# Patient Record
Sex: FEMALE | Race: WHITE | Employment: FULL TIME | URBAN - METROPOLITAN AREA
[De-identification: names, ages, dates, MRNs, and addresses within clinical notes are randomized per-mention and may not be internally consistent; named-entity substitution may affect disease eponyms.]

---

## 2017-01-07 ENCOUNTER — HOSPITAL ENCOUNTER (OUTPATIENT)
Dept: ULTRASOUND IMAGING | Age: 52
Discharge: HOME | End: 2017-01-07
Attending: INTERNAL MEDICINE
Payer: COMMERCIAL

## 2017-01-07 DIAGNOSIS — C50.511 MALIGNANT NEOPLASM OF LOWER-OUTER QUADRANT OF RIGHT FEMALE BREAST (HCC): ICD-10-CM

## 2017-01-07 PROCEDURE — 76700 US EXAM ABDOM COMPLETE: CPT

## 2017-01-16 ENCOUNTER — HOSPITAL ENCOUNTER (OUTPATIENT)
Dept: PREADMISSION TESTING | Age: 52
Discharge: HOME | End: 2017-01-16
Attending: PLASTIC SURGERY
Payer: COMMERCIAL

## 2017-01-16 VITALS
TEMPERATURE: 97.9 F | RESPIRATION RATE: 16 BRPM | OXYGEN SATURATION: 97 % | HEART RATE: 86 BPM | SYSTOLIC BLOOD PRESSURE: 139 MMHG | HEIGHT: 62 IN | WEIGHT: 175.04 LBS | DIASTOLIC BLOOD PRESSURE: 91 MMHG | BODY MASS INDEX: 32.21 KG/M2

## 2017-01-16 LAB
ANION GAP BLD CALC-SCNC: 12 MMOL/L (ref 5–15)
BUN SERPL-MCNC: 10 MG/DL (ref 6–20)
BUN/CREAT SERPL: 17 (ref 12–20)
CALCIUM SERPL-MCNC: 8.6 MG/DL (ref 8.5–10.1)
CHLORIDE SERPL-SCNC: 108 MMOL/L (ref 97–108)
CO2 SERPL-SCNC: 24 MMOL/L (ref 21–32)
CREAT SERPL-MCNC: 0.58 MG/DL (ref 0.55–1.02)
ERYTHROCYTE [DISTWIDTH] IN BLOOD BY AUTOMATED COUNT: 12.9 % (ref 11.5–14.5)
GLUCOSE SERPL-MCNC: 83 MG/DL (ref 65–100)
HCT VFR BLD AUTO: 41.5 % (ref 35–47)
HGB BLD-MCNC: 14.1 G/DL (ref 11.5–16)
MCH RBC QN AUTO: 29.9 PG (ref 26–34)
MCHC RBC AUTO-ENTMCNC: 34 G/DL (ref 30–36.5)
MCV RBC AUTO: 88.1 FL (ref 80–99)
PLATELET # BLD AUTO: 261 K/UL (ref 150–400)
POTASSIUM SERPL-SCNC: 3.7 MMOL/L (ref 3.5–5.1)
RBC # BLD AUTO: 4.71 M/UL (ref 3.8–5.2)
SODIUM SERPL-SCNC: 144 MMOL/L (ref 136–145)
WBC # BLD AUTO: 7.2 K/UL (ref 3.6–11)

## 2017-01-16 PROCEDURE — 93005 ELECTROCARDIOGRAM TRACING: CPT

## 2017-01-16 PROCEDURE — 85027 COMPLETE CBC AUTOMATED: CPT | Performed by: PLASTIC SURGERY

## 2017-01-16 PROCEDURE — 80048 BASIC METABOLIC PNL TOTAL CA: CPT | Performed by: PLASTIC SURGERY

## 2017-01-16 PROCEDURE — 36415 COLL VENOUS BLD VENIPUNCTURE: CPT | Performed by: PLASTIC SURGERY

## 2017-01-16 RX ORDER — TAMOXIFEN CITRATE 20 MG/1
20 TABLET ORAL DAILY
COMMUNITY
End: 2021-09-13

## 2017-01-16 NOTE — PERIOP NOTES
[facility]  Preoperative Instructions        Surgery Date 1/27/2017         Time of Arrival 11:00 AM    1. On the day of your surgery, please report to the Surgical Services Registration Desk and sign in at your designated time. The Surgery Center is located to the right of the Emergency Room. 2. You must have someone with you to drive you home. You should not drive a car for 24 hours following surgery. Please make arrangements for a friend or family member to stay with you for the first 24 hours after your surgery. 3. Do not have anything to eat or drink (including water, gum, mints, coffee, juice) after midnight 1/26/2017             . This may not apply to medications prescribed by your physician. Please note special instructions, if applicable. If you are currently taking Plavix, Coumadin, or other blood-thinning agents, contact your surgeon for instructions. 4. We recommend you do not drink any alcoholic beverages for 24 hours before and after your surgery. 5. Have a list of all current medications, including vitamins, herbal supplements and any other over the counter medications. Stop all Aspirin and non-steroidal anti-inflammatory drugs (I.e. Advil, Aleve), as directed by your surgeon's office. Stop all vitamins and herbal supplements seven days prior to your surgery. 6. Wear comfortable clothes. Wear glasses instead of contacts. Do not bring any money or jewelry. Please bring picture ID, insurance card, and any prearranged co-payment or hospital payment. Do not wear make-up, particularly mascara the morning of your surgery. Do not wear nail polish, particularly if you are having foot /hand surgery. Wear your hair loose or down, no ponytails, buns, tash pins or clips. All body piercings must be removed.   Please shower with antibacterial soap for three consecutive days before and on the morning of surgery, but do not apply any lotions, powders or deodorants after the shower on the day of surgery. Please use a fresh towels after each shower. Please sleep in clean clothes and change bed linens the night before surgery. Please do not shave for 48 hours prior to surgery. Shaving of the face is acceptable. 7. You should understand that if you do not follow these instructions your surgery may be cancelled. If your physical condition changes (I.e. fever, cold or flu) please contact your surgeon as soon as possible. 8. It is important that you be on time. If a situation occurs where you may be late, please call [phone] (OR Holding Area). 9. If you have any questions and or problems, please call [phone] (Pre-admission Testing). 10. Your surgery time may be subject to change. You will receive a phone call the evening prior if your time changes. 11.  If having outpatient surgery, you must have someone to drive you here, stay with you during the duration of your stay, and to drive you home at time of discharge. Special Instructions:Stop Voltaren Gel 7 days prior to surgery    MEDICATIONS TO TAKE THE MORNING OF SURGERY WITH A SIP OF WATER:Prilosec, Tamoxifen, Xanax if needed      I understand a pre-operative phone call will be made to verify my surgery time. In the event that I am not available, I give permission for a message to be left on my answering service and/or with another person?   Yes [phone]         ___________________      __________   _________    (Signature of Patient)             (Witness)                (Date and Time)

## 2017-01-17 LAB
ATRIAL RATE: 82 BPM
CALCULATED P AXIS, ECG09: 40 DEGREES
CALCULATED R AXIS, ECG10: -5 DEGREES
CALCULATED T AXIS, ECG11: 16 DEGREES
DIAGNOSIS, 93000: NORMAL
P-R INTERVAL, ECG05: 136 MS
Q-T INTERVAL, ECG07: 400 MS
QRS DURATION, ECG06: 70 MS
QTC CALCULATION (BEZET), ECG08: 467 MS
VENTRICULAR RATE, ECG03: 82 BPM

## 2017-02-10 ENCOUNTER — HOSPITAL ENCOUNTER (OUTPATIENT)
Age: 52
Setting detail: OUTPATIENT SURGERY
Discharge: HOME | End: 2017-02-10
Attending: PLASTIC SURGERY | Admitting: PLASTIC SURGERY
Payer: COMMERCIAL

## 2017-02-10 ENCOUNTER — ANESTHESIA EVENT (OUTPATIENT)
Dept: SURGERY | Age: 52
End: 2017-02-10
Payer: COMMERCIAL

## 2017-02-10 ENCOUNTER — ANESTHESIA (OUTPATIENT)
Dept: SURGERY | Age: 52
End: 2017-02-10
Payer: COMMERCIAL

## 2017-02-10 ENCOUNTER — SURGERY (OUTPATIENT)
Age: 52
End: 2017-02-10

## 2017-02-10 VITALS
OXYGEN SATURATION: 95 % | SYSTOLIC BLOOD PRESSURE: 133 MMHG | TEMPERATURE: 98 F | HEART RATE: 90 BPM | HEIGHT: 62 IN | BODY MASS INDEX: 31.81 KG/M2 | WEIGHT: 172.84 LBS | DIASTOLIC BLOOD PRESSURE: 80 MMHG | RESPIRATION RATE: 14 BRPM

## 2017-02-10 PROCEDURE — 77030008684 HC TU ET CUF COVD -B: Performed by: NURSE ANESTHETIST, CERTIFIED REGISTERED

## 2017-02-10 PROCEDURE — 74011000272 HC RX REV CODE- 272: Performed by: PLASTIC SURGERY

## 2017-02-10 PROCEDURE — 77030026227 HC FUNL KELLR 2 PCH ALGN -C: Performed by: PLASTIC SURGERY

## 2017-02-10 PROCEDURE — 77030008459 HC STPLR SKN COOP -B: Performed by: PLASTIC SURGERY

## 2017-02-10 PROCEDURE — C1789 PROSTHESIS, BREAST, IMP: HCPCS | Performed by: PLASTIC SURGERY

## 2017-02-10 PROCEDURE — 77030026438 HC STYL ET INTUB CARD -A: Performed by: NURSE ANESTHETIST, CERTIFIED REGISTERED

## 2017-02-10 PROCEDURE — 77030013079 HC BLNKT BAIR HGGR 3M -A: Performed by: ANESTHESIOLOGY

## 2017-02-10 PROCEDURE — 76210000016 HC OR PH I REC 1 TO 1.5 HR: Performed by: PLASTIC SURGERY

## 2017-02-10 PROCEDURE — 77030020269 HC MISC IMPL: Performed by: PLASTIC SURGERY

## 2017-02-10 PROCEDURE — 77030010507 HC ADH SKN DERMBND J&J -B: Performed by: PLASTIC SURGERY

## 2017-02-10 PROCEDURE — 76060000035 HC ANESTHESIA 2 TO 2.5 HR: Performed by: PLASTIC SURGERY

## 2017-02-10 PROCEDURE — 77030018836 HC SOL IRR NACL ICUM -A: Performed by: PLASTIC SURGERY

## 2017-02-10 PROCEDURE — 77030018846 HC SOL IRR STRL H20 ICUM -A: Performed by: PLASTIC SURGERY

## 2017-02-10 PROCEDURE — 77030020782 HC GWN BAIR PAWS FLX 3M -B

## 2017-02-10 PROCEDURE — 74011250636 HC RX REV CODE- 250/636

## 2017-02-10 PROCEDURE — 76010000131 HC OR TIME 2 TO 2.5 HR: Performed by: PLASTIC SURGERY

## 2017-02-10 PROCEDURE — 77030011264 HC ELECTRD BLD EXT COVD -A: Performed by: PLASTIC SURGERY

## 2017-02-10 PROCEDURE — 77030008771 HC TU NG SALEM SUMP -A: Performed by: NURSE ANESTHETIST, CERTIFIED REGISTERED

## 2017-02-10 PROCEDURE — 77030008467 HC STPLR SKN COVD -B: Performed by: PLASTIC SURGERY

## 2017-02-10 PROCEDURE — 77030013674 HC FIL EXPND TISS ALGN -A: Performed by: PLASTIC SURGERY

## 2017-02-10 PROCEDURE — 76210000020 HC REC RM PH II FIRST 0.5 HR: Performed by: PLASTIC SURGERY

## 2017-02-10 PROCEDURE — 77030020263 HC SOL INJ SOD CL0.9% LFCR 1000ML: Performed by: PLASTIC SURGERY

## 2017-02-10 PROCEDURE — 74011000250 HC RX REV CODE- 250: Performed by: PLASTIC SURGERY

## 2017-02-10 PROCEDURE — 77030011640 HC PAD GRND REM COVD -A: Performed by: PLASTIC SURGERY

## 2017-02-10 PROCEDURE — 77030031139 HC SUT VCRL2 J&J -A: Performed by: PLASTIC SURGERY

## 2017-02-10 PROCEDURE — 77030002933 HC SUT MCRYL J&J -A: Performed by: PLASTIC SURGERY

## 2017-02-10 PROCEDURE — 74011250637 HC RX REV CODE- 250/637: Performed by: ANESTHESIOLOGY

## 2017-02-10 PROCEDURE — 77030032490 HC SLV COMPR SCD KNE COVD -B: Performed by: PLASTIC SURGERY

## 2017-02-10 PROCEDURE — 74011250636 HC RX REV CODE- 250/636: Performed by: PLASTIC SURGERY

## 2017-02-10 PROCEDURE — 77030019908 HC STETH ESOPH SIMS -A: Performed by: NURSE ANESTHETIST, CERTIFIED REGISTERED

## 2017-02-10 PROCEDURE — 74011000250 HC RX REV CODE- 250

## 2017-02-10 PROCEDURE — 74011250636 HC RX REV CODE- 250/636: Performed by: ANESTHESIOLOGY

## 2017-02-10 RX ORDER — PROPOFOL 10 MG/ML
INJECTION, EMULSION INTRAVENOUS AS NEEDED
Status: DISCONTINUED | OUTPATIENT
Start: 2017-02-10 | End: 2017-02-10 | Stop reason: HOSPADM

## 2017-02-10 RX ORDER — SODIUM CHLORIDE, SODIUM LACTATE, POTASSIUM CHLORIDE, CALCIUM CHLORIDE 600; 310; 30; 20 MG/100ML; MG/100ML; MG/100ML; MG/100ML
25 INJECTION, SOLUTION INTRAVENOUS CONTINUOUS
Status: DISCONTINUED | OUTPATIENT
Start: 2017-02-10 | End: 2017-02-10 | Stop reason: HOSPADM

## 2017-02-10 RX ORDER — CEPHALEXIN 500 MG/1
500 CAPSULE ORAL 4 TIMES DAILY
Qty: 20 CAP | Refills: 0 | Status: SHIPPED | OUTPATIENT
Start: 2017-02-10 | End: 2017-02-15

## 2017-02-10 RX ORDER — HYDROMORPHONE HYDROCHLORIDE 2 MG/ML
INJECTION, SOLUTION INTRAMUSCULAR; INTRAVENOUS; SUBCUTANEOUS AS NEEDED
Status: DISCONTINUED | OUTPATIENT
Start: 2017-02-10 | End: 2017-02-10 | Stop reason: HOSPADM

## 2017-02-10 RX ORDER — DIPHENHYDRAMINE HYDROCHLORIDE 50 MG/ML
12.5 INJECTION, SOLUTION INTRAMUSCULAR; INTRAVENOUS AS NEEDED
Status: DISCONTINUED | OUTPATIENT
Start: 2017-02-10 | End: 2017-02-10 | Stop reason: HOSPADM

## 2017-02-10 RX ORDER — LIDOCAINE HYDROCHLORIDE 10 MG/ML
0.1 INJECTION, SOLUTION EPIDURAL; INFILTRATION; INTRACAUDAL; PERINEURAL AS NEEDED
Status: DISCONTINUED | OUTPATIENT
Start: 2017-02-10 | End: 2017-02-10 | Stop reason: HOSPADM

## 2017-02-10 RX ORDER — CEFAZOLIN SODIUM IN 0.9 % NACL 2 G/100 ML
2 PLASTIC BAG, INJECTION (ML) INTRAVENOUS EVERY 8 HOURS
Status: COMPLETED | OUTPATIENT
Start: 2017-02-10 | End: 2017-02-10

## 2017-02-10 RX ORDER — DEXAMETHASONE SODIUM PHOSPHATE 4 MG/ML
INJECTION, SOLUTION INTRA-ARTICULAR; INTRALESIONAL; INTRAMUSCULAR; INTRAVENOUS; SOFT TISSUE AS NEEDED
Status: DISCONTINUED | OUTPATIENT
Start: 2017-02-10 | End: 2017-02-10 | Stop reason: HOSPADM

## 2017-02-10 RX ORDER — ESMOLOL HYDROCHLORIDE 10 MG/ML
INJECTION INTRAVENOUS AS NEEDED
Status: DISCONTINUED | OUTPATIENT
Start: 2017-02-10 | End: 2017-02-10 | Stop reason: HOSPADM

## 2017-02-10 RX ORDER — ROCURONIUM BROMIDE 10 MG/ML
INJECTION, SOLUTION INTRAVENOUS AS NEEDED
Status: DISCONTINUED | OUTPATIENT
Start: 2017-02-10 | End: 2017-02-10 | Stop reason: HOSPADM

## 2017-02-10 RX ORDER — HYDROMORPHONE HYDROCHLORIDE 2 MG/1
2 TABLET ORAL
Qty: 40 TAB | Refills: 0 | Status: SHIPPED | OUTPATIENT
Start: 2017-02-10 | End: 2017-04-28

## 2017-02-10 RX ORDER — PROMETHAZINE HYDROCHLORIDE 12.5 MG/1
12.5 TABLET ORAL
Qty: 10 TAB | Refills: 2 | Status: SHIPPED | OUTPATIENT
Start: 2017-02-10 | End: 2018-01-09

## 2017-02-10 RX ORDER — HYDROMORPHONE HYDROCHLORIDE 1 MG/ML
.2-.5 INJECTION, SOLUTION INTRAMUSCULAR; INTRAVENOUS; SUBCUTANEOUS
Status: DISCONTINUED | OUTPATIENT
Start: 2017-02-10 | End: 2017-02-10 | Stop reason: HOSPADM

## 2017-02-10 RX ORDER — MORPHINE SULFATE 10 MG/ML
2 INJECTION, SOLUTION INTRAMUSCULAR; INTRAVENOUS
Status: DISCONTINUED | OUTPATIENT
Start: 2017-02-10 | End: 2017-02-10 | Stop reason: HOSPADM

## 2017-02-10 RX ORDER — FENTANYL CITRATE 50 UG/ML
50 INJECTION, SOLUTION INTRAMUSCULAR; INTRAVENOUS AS NEEDED
Status: DISCONTINUED | OUTPATIENT
Start: 2017-02-10 | End: 2017-02-10 | Stop reason: HOSPADM

## 2017-02-10 RX ORDER — TRAMADOL HYDROCHLORIDE 50 MG/1
50 TABLET ORAL
COMMUNITY
End: 2019-01-18

## 2017-02-10 RX ORDER — FENTANYL CITRATE 50 UG/ML
INJECTION, SOLUTION INTRAMUSCULAR; INTRAVENOUS AS NEEDED
Status: DISCONTINUED | OUTPATIENT
Start: 2017-02-10 | End: 2017-02-10 | Stop reason: HOSPADM

## 2017-02-10 RX ORDER — ACETAMINOPHEN 10 MG/ML
INJECTION, SOLUTION INTRAVENOUS AS NEEDED
Status: DISCONTINUED | OUTPATIENT
Start: 2017-02-10 | End: 2017-02-10 | Stop reason: HOSPADM

## 2017-02-10 RX ORDER — MIDAZOLAM HYDROCHLORIDE 1 MG/ML
0.5 INJECTION, SOLUTION INTRAMUSCULAR; INTRAVENOUS
Status: DISCONTINUED | OUTPATIENT
Start: 2017-02-10 | End: 2017-02-10 | Stop reason: HOSPADM

## 2017-02-10 RX ORDER — ONDANSETRON 2 MG/ML
INJECTION INTRAMUSCULAR; INTRAVENOUS AS NEEDED
Status: DISCONTINUED | OUTPATIENT
Start: 2017-02-10 | End: 2017-02-10 | Stop reason: HOSPADM

## 2017-02-10 RX ORDER — FENTANYL CITRATE 50 UG/ML
25 INJECTION, SOLUTION INTRAMUSCULAR; INTRAVENOUS
Status: DISCONTINUED | OUTPATIENT
Start: 2017-02-10 | End: 2017-02-10 | Stop reason: HOSPADM

## 2017-02-10 RX ORDER — LIDOCAINE HYDROCHLORIDE 20 MG/ML
INJECTION, SOLUTION EPIDURAL; INFILTRATION; INTRACAUDAL; PERINEURAL AS NEEDED
Status: DISCONTINUED | OUTPATIENT
Start: 2017-02-10 | End: 2017-02-10 | Stop reason: HOSPADM

## 2017-02-10 RX ORDER — ONDANSETRON 2 MG/ML
4 INJECTION INTRAMUSCULAR; INTRAVENOUS AS NEEDED
Status: DISCONTINUED | OUTPATIENT
Start: 2017-02-10 | End: 2017-02-10 | Stop reason: HOSPADM

## 2017-02-10 RX ORDER — SUCCINYLCHOLINE CHLORIDE 20 MG/ML
INJECTION INTRAMUSCULAR; INTRAVENOUS AS NEEDED
Status: DISCONTINUED | OUTPATIENT
Start: 2017-02-10 | End: 2017-02-10 | Stop reason: HOSPADM

## 2017-02-10 RX ORDER — SCOLOPAMINE TRANSDERMAL SYSTEM 1 MG/1
1.5 PATCH, EXTENDED RELEASE TRANSDERMAL
Status: DISCONTINUED | OUTPATIENT
Start: 2017-02-10 | End: 2017-02-10 | Stop reason: HOSPADM

## 2017-02-10 RX ORDER — SODIUM CHLORIDE 0.9 % (FLUSH) 0.9 %
5-10 SYRINGE (ML) INJECTION AS NEEDED
Status: DISCONTINUED | OUTPATIENT
Start: 2017-02-10 | End: 2017-02-10 | Stop reason: HOSPADM

## 2017-02-10 RX ORDER — MIDAZOLAM HYDROCHLORIDE 1 MG/ML
INJECTION, SOLUTION INTRAMUSCULAR; INTRAVENOUS AS NEEDED
Status: DISCONTINUED | OUTPATIENT
Start: 2017-02-10 | End: 2017-02-10 | Stop reason: HOSPADM

## 2017-02-10 RX ADMIN — FENTANYL CITRATE 100 MCG: 50 INJECTION, SOLUTION INTRAMUSCULAR; INTRAVENOUS at 07:51

## 2017-02-10 RX ADMIN — ONDANSETRON 4 MG: 2 INJECTION INTRAMUSCULAR; INTRAVENOUS at 09:34

## 2017-02-10 RX ADMIN — LIDOCAINE HYDROCHLORIDE 50 ML: 10; .005 INJECTION, SOLUTION EPIDURAL; INFILTRATION; INTRACAUDAL; PERINEURAL at 09:29

## 2017-02-10 RX ADMIN — FENTANYL CITRATE 25 MCG: 50 INJECTION, SOLUTION INTRAMUSCULAR; INTRAVENOUS at 11:00

## 2017-02-10 RX ADMIN — ESMOLOL HYDROCHLORIDE 10 MG: 10 INJECTION INTRAVENOUS at 10:02

## 2017-02-10 RX ADMIN — SODIUM CHLORIDE, SODIUM LACTATE, POTASSIUM CHLORIDE, AND CALCIUM CHLORIDE 25 ML/HR: 600; 310; 30; 20 INJECTION, SOLUTION INTRAVENOUS at 07:09

## 2017-02-10 RX ADMIN — HYDROMORPHONE HYDROCHLORIDE 0.25 MG: 2 INJECTION, SOLUTION INTRAMUSCULAR; INTRAVENOUS; SUBCUTANEOUS at 08:23

## 2017-02-10 RX ADMIN — PROPOFOL 50 MG: 10 INJECTION, EMULSION INTRAVENOUS at 08:14

## 2017-02-10 RX ADMIN — FENTANYL CITRATE 25 MCG: 50 INJECTION, SOLUTION INTRAMUSCULAR; INTRAVENOUS at 10:35

## 2017-02-10 RX ADMIN — MIDAZOLAM HYDROCHLORIDE 2 MG: 1 INJECTION, SOLUTION INTRAMUSCULAR; INTRAVENOUS at 07:43

## 2017-02-10 RX ADMIN — LIDOCAINE HYDROCHLORIDE 80 MG: 20 INJECTION, SOLUTION EPIDURAL; INFILTRATION; INTRACAUDAL; PERINEURAL at 07:51

## 2017-02-10 RX ADMIN — PROPOFOL 200 MG: 10 INJECTION, EMULSION INTRAVENOUS at 07:51

## 2017-02-10 RX ADMIN — SODIUM CHLORIDE, SODIUM LACTATE, POTASSIUM CHLORIDE, AND CALCIUM CHLORIDE: 600; 310; 30; 20 INJECTION, SOLUTION INTRAVENOUS at 09:19

## 2017-02-10 RX ADMIN — CEFAZOLIN 2 G: 10 INJECTION, POWDER, FOR SOLUTION INTRAVENOUS; PARENTERAL at 07:44

## 2017-02-10 RX ADMIN — HYDROMORPHONE HYDROCHLORIDE 0.25 MG: 2 INJECTION, SOLUTION INTRAMUSCULAR; INTRAVENOUS; SUBCUTANEOUS at 09:34

## 2017-02-10 RX ADMIN — ACETAMINOPHEN 1000 MG: 10 INJECTION, SOLUTION INTRAVENOUS at 08:34

## 2017-02-10 RX ADMIN — HYDROMORPHONE HYDROCHLORIDE 0.25 MG: 2 INJECTION, SOLUTION INTRAMUSCULAR; INTRAVENOUS; SUBCUTANEOUS at 08:15

## 2017-02-10 RX ADMIN — ROCURONIUM BROMIDE 10 MG: 10 INJECTION, SOLUTION INTRAVENOUS at 07:51

## 2017-02-10 RX ADMIN — SUCCINYLCHOLINE CHLORIDE 160 MG: 20 INJECTION INTRAMUSCULAR; INTRAVENOUS at 07:51

## 2017-02-10 RX ADMIN — DEXAMETHASONE SODIUM PHOSPHATE 8 MG: 4 INJECTION, SOLUTION INTRA-ARTICULAR; INTRALESIONAL; INTRAMUSCULAR; INTRAVENOUS; SOFT TISSUE at 08:26

## 2017-02-10 RX ADMIN — BACITRACIN 1000 ML: 50000 INJECTION, POWDER, FOR SOLUTION INTRAMUSCULAR at 09:38

## 2017-02-10 NOTE — OP NOTES
[address], [facility]   OP NOTE       Name:  Anahi Crow   MR#:  [id number]   :  1965   Account #:  [de-identified]    Surgery Date:  02/10/2017   Date of Adm:  02/10/2017       PREOPERATIVE DIAGNOSES   1. Personal history of right breast cancer. 2. Acquired absence of right breast and nipple. 3. Left breast ptosis. 4. Breast asymmetry and disproportion after reconstruction. POSTOPERATIVE DIAGNOSES   1. Personal history of right breast cancer. 2. Acquired absence of right breast and nipple. 3. Left breast ptosis. 4. Breast asymmetry and disproportion after reconstruction. PROCEDURES PERFORMED   1. Delayed right breast reconstruction with breast implants (Riparius 190   mL smooth, round, Moderate Profile gel). 2. Left augmentation mastopexy (Riparius 170 mL smooth, round,   Moderate Profile gel). SURGEON: Sarah Beth Leyva MD    ANESTHESIA: General endotracheal.    ESTIMATED BLOOD LOSS: Approximately 20 mL. SPECIMENS REMOVED: None. COMPLICATIONS: None. INDICATIONS: This 75-year-old white female presented after   undergoing right subtotal mastectomy, including removal of her nipple   for breast cancer. She had completed adjuvant radiation therapy as   well. She presented with a significant right breast defect and required   reconstruction with an implant. This would leave her with significant   breast asymmetry, as the left breast was ptotic and would require an   implant as well to match. Risks, benefits, and alternatives were   discussed preoperatively, and she agreed to proceed. DESCRIPTION OF PROCEDURE: After informed consent was   obtained, she was marked in the preoperative holding area in the   standing position. She was then taken to the operating room, where a   general anesthetic was given.  The chest was infiltrated with a mixture   of 1% lidocaine with epinephrine and 0.25% bupivacaine with   epinephrine and then prepped and draped. Attention was turned to the right breast, where a 3 cm inframammary   incision was made. The lateral edge of the pectoralis muscle was   identified, and a subpectoral pocket was sharply developed. There was   extensive fibrosis from her previous operation and from radiation. This   made dissection difficult. The pectoralis muscle was released from the   chest wall inferiorly. Dissection continued to the inframammary fold,   thereby creating a dual plane-type pocket. Radial scoring was   performed along the breast in the lower pole, allowing this area to   expand to accommodate the implant. When the pocket was   satisfactory, hemostasis was obtained. A saline sizer was inserted and   filled with 190 mL sterile IV saline. Attention was then turned to the left side, where a similar procedure   was carried out, again utilizing an inframammary incision to create a   dual plane-type pocket. Again, a sizer was inserted and filled with 170   mL. The patient was placed in the seated position. There was   significant asymmetry, with the left breast more ptotic. A vertical   mammoplasty pattern was designed and tailor tacked with staples. Symmetry was much improved. She was returned to the supine   position. The saline sizers were removed, and the pockets were   irrigated with saline and triple-antibiotic solution. Gel implants were   then placed using the Zimmerman funnel and no-touch technique. The   incisions were closed in layers with absorbable suture. Attention was then turned to the left breast, where the staples were   removed. The vertical mammoplasty pattern was de-epithelialized,   excluding the nipple-areolar complex. Skin flaps were raised   circumferentially. Hemostasis was obtained. The vertical pillars were   approximated with 3-0 Vicryl. The nipple was brought into its new   position and secured.  Remaining incisions were closed in layers with   absorbable staples and sutures. Dermabond and dry dressings were   applied. She tolerated the procedure well and was transferred to the recovery   room in good condition after extubation.         Kvng Hester MD        / Sivan Nunez   D:  02/10/2017   11:20   T:  02/10/2017   11:48   Job #:  [id number]

## 2017-02-10 NOTE — BRIEF OP NOTE
BRIEF OPERATIVE NOTE    Date of Procedure: 2/10/2017   Preoperative Diagnosis: BREAST CANCER  Postoperative Diagnosis: BREAST CANCER    Procedure(s):  RIGHT BREAST RECONSTRUCTION WITH BREAST IMPLANT ([place] mod profile 190ml), LEFT BREAST AUGMENTATION AND MASTOPEXY ([place] 170ml mod profile)  Surgeon(s) and Role:     * Seb Steele MD - Primary            Surgical Staff:  Circ-1: Shane Gage RN  Scrub Tech-1: Donell Marx  Scrub Tech-2: Vira Castle  Surg Asst-1: Kevin viviana  Float Staff: Paramjit Ruiz RN  Event Time In   Incision Start 1805   Incision Close      Anesthesia: General   Estimated Blood Loss: 20ml  Specimens: * No specimens in log *   Findings: see note   Complications: none  Implants:   Implant Name Type Inv.  Item Serial No.  Lot No. LRB No. Used Action   MENTOR MEMORY GEL BREAST IMPLANT   [id number]  [id number] Right 1 Implanted   MENTOR MEMORY GEL BREAST IMPLANT     [id number]   [id number] Left 1 Implanted

## 2017-02-10 NOTE — DISCHARGE INSTRUCTIONS
May remove dressings in 24hrs and shower and get incisions wet. Wear bra at all times unless showering. No heavy lifting or vigorous activity. FOLLOW UP VISIT Appointment in: Two Weeks Call [phone] to make appt      DISCHARGE SUMMARY from Nurse    The following personal items are in your possession at time of discharge:    Dental Appliances: None        Home Medications: None  Jewelry: None  Clothing: Other (comment) (street clothes)  Other Valuables: Eyeglasses (and eyeglass case)  Personal Items Sent to Safe: declined          PATIENT INSTRUCTIONS:    After general anesthesia or intravenous sedation, for 24 hours or while taking prescription Narcotics:  · Limit your activities  · Do not drive and operate hazardous machinery  · Do not make important personal or business decisions  · Do  not drink alcoholic beverages  · If you have not urinated within 8 hours after discharge, please contact your surgeon on call. Report the following to your surgeon:  · Excessive pain, swelling, redness or odor of or around the surgical area  · Temperature over 100.5  · Nausea and vomiting lasting longer than 4 hours or if unable to take medications  · Any signs of decreased circulation or nerve impairment to extremity: change in color, persistent  numbness, tingling, coldness or increase pain  · Any questions        What to do at Home:    *  Please give a list of your current medications to your Primary Care Provider. *  Please update this list whenever your medications are discontinued, doses are      changed, or new medications (including over-the-counter products) are added. *  Please carry medication information at all times in case of emergency situations. These are general instructions for a healthy lifestyle:    No smoking/ No tobacco products/ Avoid exposure to second hand smoke    Surgeon General's Warning:  Quitting smoking now greatly reduces serious risk to your health.     Obesity, smoking, and sedentary lifestyle greatly increases your risk for illness    A healthy diet, regular physical exercise & weight monitoring are important for maintaining a healthy lifestyle    You may be retaining fluid if you have a history of heart failure or if you experience any of the following symptoms:  Weight gain of 3 pounds or more overnight or 5 pounds in a week, increased swelling in our hands or feet or shortness of breath while lying flat in bed. Please call your doctor as soon as you notice any of these symptoms; do not wait until your next office visit. Recognize signs and symptoms of STROKE:    F-face looks uneven    A-arms unable to move or move unevenly    S-speech slurred or non-existent    T-time-call 911 as soon as signs and symptoms begin-DO NOT go       Back to bed or wait to see if you get better-TIME IS BRAIN. Warning Signs of HEART ATTACK     Call 911 if you have these symptoms:   Chest discomfort. Most heart attacks involve discomfort in the center of the chest that lasts more than a few minutes, or that goes away and comes back. It can feel like uncomfortable pressure, squeezing, fullness, or pain.  Discomfort in other areas of the upper body. Symptoms can include pain or discomfort in one or both arms, the back, neck, jaw, or stomach.  Shortness of breath with or without chest discomfort.  Other signs may include breaking out in a cold sweat, nausea, or lightheadedness. Don't wait more than five minutes to call 911 - MINUTES MATTER! Fast action can save your life. Calling 911 is almost always the fastest way to get lifesaving treatment. Emergency Medical Services staff can begin treatment when they arrive -- up to an hour sooner than if someone gets to the hospital by car. The discharge information has been reviewed with the patient and caregiver. The patient and caregiver verbalized understanding.     Discharge medications reviewed with the patient and caregiver and appropriate educational materials and side effects teaching were provided. A common side effect of anesthesia following surgery is nausea and/or vomiting. In order to decrease symptoms, it is wise to avoid foods that are high in fat, greasy foods, milk products, and spicy foods for the first 24 hours. Acceptable foods for the first 24 hours following surgery include but are not limited to:     soup   broth    toast    crackers    applesauce    bananas    mashed potatoes,   soft or scrambled eggs   oatmeal    jello    It is important to eat when taking your pain medication. This will help to prevent nausea. If possible, please try to time your meals with your medications. It is very important to stay hydrated following surgery. Sip fluids frequently while awake. Avoid acidic drinks such as citrus juices and soda for 24 hours. Carbonated beverages may cause bloating and gas. Acceptable fluids include:    - water (flavor packets may add variety)  - coffee or tea (in moderation)  - Gatorade  - Dale-aid  - apple juice  - cranberry juice    You are encouraged to cough and deep breathe every hour when awake. This will help to prevent respiratory complications following anesthesia. You may want to hug a pillow when coughing and sneezing to add additional support to the surgical area and to decrease discomfort if you had abdominal or chest surgery. If you are discharged home with support stockings, you may remove them after 24 hours. Support stockings are used to help prevent blood clots in the legs following surgery. Please take time to review all of your Home Care Instructions and Medication Information sheets provided in your discharge packet. If you have any questions, please contact your surgeons office. Thank you.               How to Care for Your Wound After Its Treated With  DERMABOND* Topical Skin Adhesive  DERMABOND* Topical Skin Adhesive (2-octyl cyanoacrylate) is a sterile, liquid skin adhesive  that holds wound edges together. The film will usually remain in place for 5 to 10 days, then  naturally fall off your skin. The following will answer some of your questions and provide instructions for proper care for your  wound while it is healing:    CHECK WOUND APPEARANCE   Some swelling, redness, and pain are common with all wounds and normally will go away as the  wound heals. If swelling, redness, or pain increases or if the wound feels warm to the touch,  contact a doctor. Also contact a doctor if the wound edges reopen or separate. REPLACE BANDAGES   If your wound is bandaged, keep the bandage dry.  Replace the dressing daily until the adhesive film has fallen off or if the  bandage should become wet, unless otherwise instructed by your  physician.  When changing the dressing, do not place tape directly over the  DERMABOND adhesive film, because removing the tape later may also  remove the film. AVOID TOPICAL MEDICATIONS   Do not apply liquid or ointment medications or any other product to your wound while the  DERMABOND adhesive film is in place. These may loosen the film before your wound is healed. KEEP WOUND DRY AND PROTECTED   You may occasionally and briefly wet your wound in the shower or bath. Do not soak or scrub  your wound, do not swim, and avoid periods of heavy perspiration until the DERMABOND  adhesive has naturally fallen off. After showering or bathing, gently blot your wound dry with a  soft towel. If a protective dressing is being used, apply a fresh, dry bandage, being sure to keep  the tape off the DERMABOND adhesive film.  Apply a clean, dry bandage over the wound if necessary to protect it.  Protect your wound from injury until the skin has had sufficient time to heal.   Do not scratch, rub, or pick at the DERMABOND adhesive film. This may loosen the film before  your wound is healed.    Protect the wound from prolonged exposure to sunlight or tanning lamps while the film is in  place. If you have any questions or concerns about this product, please consult your doctor. *Trademark ©[employer] 2002    Cephalexin (By mouth)   Cephalexin (fym-b-YPE-in)  Treats infections. This medicine is a cephalosporin antibiotic. Brand Name(s):Keflex   There may be other brand names for this medicine. When This Medicine Should Not Be Used: This medicine is not right for everyone. Do not use this medicine if you had an allergic reaction to cephalexin or another cephalosporin medicine. How to Use This Medicine:   Capsule, Tablet, Tablet for Suspension, Liquid  · Your doctor will tell you how much medicine to use. Do not use more than directed. · Read and follow the patient instructions that come with this medicine. Talk to your doctor or pharmacist if you have any questions. · You may take your medicine with food or milk to avoid stomach upset. · Oral liquid: Shake well just before use. Measure the oral liquid medicine with a marked measuring spoon, oral syringe, or medicine cup. · Take all of the medicine in your prescription to clear up your infection, even if you feel better after the first few doses. · Missed dose: Take a dose as soon as you remember. If it is almost time for your next dose, wait until then and take a regular dose. Do not take extra medicine to make up for a missed dose. · Capsule or tablet: Store at room temperature away from heat, moisture, and direct light. · Oral liquid: Store in the refrigerator for 14 days. After 14 days, throw away any unused medicine. Do not freeze. Drugs and Foods to Avoid:   Ask your doctor or pharmacist before using any other medicine, including over-the-counter medicines, vitamins, and herbal products. · Some medicines and foods can affect how cephalexin works.  Tell your doctor if you are also using  ¨ Metformin  ¨ Probenecid  Warnings While Using This Medicine:   · Tell your doctor if you are pregnant or breastfeeding, or if you have kidney disease, liver disease, or a history of digestive problems, such as colitis. Tell your doctor if you are allergic to penicillin. · This medicine can cause diarrhea. Call your doctor if the diarrhea becomes severe, does not stop, or is bloody. Do not take any medicine to stop diarrhea until you have talked to your doctor. Diarrhea can occur 2 months or more after you stop taking this medicine. · Tell any doctor or dentist who treats you that you are using this medicine. This medicine may affect certain medical test results. · Call your doctor if your symptoms do not improve or if they get worse. · Keep all medicine out of the reach of children. Never share your medicine with anyone. Possible Side Effects While Using This Medicine:   Call your doctor right away if you notice any of these side effects:  · Allergic reaction: Itching or hives, swelling in your face or hands, swelling or tingling in your mouth or throat, chest tightness, trouble breathing  · Blistering, peeling, red skin rash  · Severe diarrhea, especially if bloody or ongoing  · Severe stomach pain, vomiting  If you notice these less serious side effects, talk with your doctor:   · Mild diarrhea or nausea  If you notice other side effects that you think are caused by this medicine, tell your doctor. Call your doctor for medical advice about side effects. You may report side effects to FDA at 8-471-FDA-4875  © 2016 [address] is for End User's use only and may not be sold, redistributed or otherwise used for commercial purposes. The above information is an  only. It is not intended as medical advice for individual conditions or treatments. Talk to your doctor, nurse or pharmacist before following any medical regimen to see if it is safe and effective for you. Hydromorphone (By mouth)   Hydromorphone (wzz-iawx-TNQ-fone)  Treats moderate to severe pain.  This medicine is a narcotic pain reliever. Brand Name(s):Dilaudid, Exalgo   There may be other brand names for this medicine. When This Medicine Should Not Be Used: This medicine is not right for everyone. Do not use it if you had an allergic reaction to hydromorphone or sulfites, or if you have severe lung or breathing problems, paralytic ileus, or stomach or bowel blockage or narrowing. How to Use This Medicine:   Long Acting Capsule, Liquid, Tablet, Long Acting Tablet  · Take your medicine as directed. Your dose may need to be changed several times to find what works best for you. An overdose can be dangerous. Follow directions carefully so you do not get too much medicine at one time. · Measure the oral liquid medicine with a marked measuring spoon, oral syringe, or medicine cup. · Swallow the extended-release capsule whole. Do not crush, break, or chew it. · Swallow the extended-release tablet whole. Do not crush, break, or chew it. · If you take the extended-release tablet, part of the tablet may pass into your stools. This is normal and is nothing to worry about. · If you get any of the liquid medicine on your skin, rinse it with cool water right away. · Hydromorphone extended-release capsules or tablets work differently than regular hydromorphone tablets, even at the same dose. Do not switch from one form to another unless your doctor tells you to. · This medicine should come with a Medication Guide. Ask your pharmacist for a copy if you do not have one. · Missed dose:   ¨ Extended-release capsules or tablets: If you miss a dose, skip the missed dose and take your next dose at the usual time the next day. Do not double doses. ¨ Liquid: Take a dose as soon as you remember. If it is almost time for your next dose, wait until then and take a regular dose. Do not take extra medicine to make up for a missed dose.   · Store the medicine in a closed container at room temperature, away from heat, moisture, and direct light. Store the medicine in a safe and secure place. Do not throw unused medicine in the trash. Ask your pharmacist about the best way to dispose of medicine you do not use. Drugs and Foods to Avoid:   Ask your doctor or pharmacist before using any other medicine, including over-the-counter medicines, vitamins, and herbal products. · Some medicines can affect how hydromorphone works. Tell your doctor if you are using any of the following:   ¨ A phenothiazine medicine  ¨ An MAO inhibitor (MAOI) within the past 14 days  · Tell your doctor if you use anything else that makes you sleepy. Some examples are allergy medicine, narcotic pain medicine, and alcohol. Tell your doctor if you are also using buprenorphine, butorphanol, nalbuphine, pentazocine, or a muscle relaxer. · Do not drink alcohol while you are using this medicine. Warnings While Using This Medicine:   · Tell your doctor if you are pregnant or breastfeeding, or if you have kidney disease, liver disease, lung disease or breathing problems (such as asthma or COPD), low blood pressure, an underactive thyroid, Edmar disease, cystic fibrosis, pancreas problems, gallbladder problems, an enlarged prostate, trouble urinating, or stomach or bowel problems. Tell your doctor if you have a history of head injury, brain tumor, seizures, depression, or alcohol or drug abuse. · This medicine may cause the following problems:  ¨ High risk of overdose, which can lead to death  ¨ Respiratory depression (serious breathing problem that can be life-threatening)  · This medicine can be habit-forming. Do not use more than your prescribed dose. Call your doctor if you think your medicine is not working. · Do not stop using this medicine suddenly. Your doctor will need to slowly decrease your dose before you stop it completely. · This medicine may make you dizzy, drowsy, or lightheaded.  Do not drive or do anything else that could be dangerous until you know how this medicine affects you. Sit or lie down if you feel dizzy. Stand up carefully. · This medicine may cause constipation, especially with long-term use. Ask your doctor if you should use a laxative to prevent and treat constipation. · Keep all medicine out of the reach of children. Never share your medicine with anyone. Possible Side Effects While Using This Medicine:   Call your doctor right away if you notice any of these side effects:  · Allergic reaction: Itching or hives, swelling in your face or hands, swelling or tingling in your mouth or throat, chest tightness, trouble breathing  · Blue lips, fingernails, or skin  · Extreme dizziness or weakness, shallow breathing, slow or uneven heartbeat, sweating, cold or clammy skin, seizures  · Severe confusion, lightheadedness, dizziness, fainting  · Severe constipation, stomach pain, or vomiting  · Trouble breathing or slow breathing  If you notice these less serious side effects, talk with your doctor:   · Mild constipation, nausea, or vomiting  · Mild sleepiness or tiredness  If you notice other side effects that you think are caused by this medicine, tell your doctor. Call your doctor for medical advice about side effects. You may report side effects to FDA at 0-056-FDA-6895  © 2016 [address] is for End User's use only and may not be sold, redistributed or otherwise used for commercial purposes. The above information is an  only. It is not intended as medical advice for individual conditions or treatments. Talk to your doctor, nurse or pharmacist before following any medical regimen to see if it is safe and effective for you. Promethazine (By mouth)   Promethazine (proe-METH-a-zeen)  Treats allergies and motion sickness. Also used before and after surgery and other procedures as a sedative and to control pain or nausea and vomiting. This medicine is a phenothiazine.    Brand Name(s):Phenergan, Promacot   There may be other brand names for this medicine. When This Medicine Should Not Be Used: This medicine is not right for everyone. Do not use it if you had an allergic reaction to promethazine or another phenothiazine medicine, or while you are having asthma symptoms or similar breathing problems. How to Use This Medicine:   Tablet, Liquid  · Your doctor will tell you how much medicine to use. Do not use more than directed. · Measure the oral liquid medicine with a marked measuring spoon, oral syringe, or medicine cup. · Missed dose: Take a dose as soon as you remember. If it is almost time for your next dose, wait until then and take a regular dose. Do not take extra medicine to make up for a missed dose. · Store the medicine in a closed container at room temperature, away from heat, moisture, and direct light. Do not freeze the oral liquid. Drugs and Foods to Avoid:   Ask your doctor or pharmacist before using any other medicine, including over-the-counter medicines, vitamins, and herbal products. · Some medicines can affect how promethazine works. Tell your doctor if you are also using an MAO inhibitor (MAOI). · Tell your doctor if you use anything else that makes you sleepy. Some examples are allergy medicine, narcotic pain medicine, and alcohol. Warnings While Using This Medicine:   · Tell your doctor if you are pregnant or breastfeeding, or if you have liver disease, heart or blood vessel disease, glaucoma, a stomach ulcer, bowel problems, an enlarged prostate, bone marrow problems, trouble urinating, or seizures. Also tell your doctor if you have breathing problems, such as COPD, asthma, or sleep apnea. · This medicine may cause the following problems:  ¨ Breathing problems, which could be life-threatening  ¨ Neuroleptic malignant syndrome (a nerve disorder that can be life-threatening)  ¨ Liver problems  · Use in children: Give the medicine exactly as directed by the child's doctor.  Too much of this medicine can cause death in a young child. Do not give this medicine to a child younger than 3years old, unless your doctor tells you to. · This medicine may make you dizzy or drowsy. Do not drive or do anything that could be dangerous until you know how this medicine affects you. · Tell any doctor or dentist who treats you that you are using this medicine. This medicine may affect certain medical test results. · This medicine may make your skin more sensitive to sunlight. Wear sunscreen. Do not use sunlamps or tanning beds. · Keep all medicine out of the reach of children. Never share your medicine with anyone. Possible Side Effects While Using This Medicine:   Call your doctor right away if you notice any of these side effects:  · Allergic reaction: Itching or hives, swelling in your face or hands, swelling or tingling in your mouth or throat, chest tightness, trouble breathing  · Fever, sweating, confusion, uneven heartbeat, muscle stiffness  · Lightheadedness or fainting  · Seeing or hearing things that are not there (especially in children)  · Seizures  · Trouble breathing, slow breathing  · Twitching or muscle movements you cannot control  · Yellow skin or eyes  If you notice these less serious side effects, talk with your doctor:   · Blurred vision  · Nausea, vomiting, constipation  If you notice other side effects that you think are caused by this medicine, tell your doctor. Call your doctor for medical advice about side effects. You may report side effects to FDA at 1-506-FDA-4657  © 2016 [address] is for End User's use only and may not be sold, redistributed or otherwise used for commercial purposes. The above information is an  only. It is not intended as medical advice for individual conditions or treatments. Talk to your doctor, nurse or pharmacist before following any medical regimen to see if it is safe and effective for you.

## 2017-02-10 NOTE — ANESTHESIA POSTPROCEDURE EVALUATION
Post-Anesthesia Evaluation and Assessment    Patient: Miguelangel Flores MRN: [id number]  SSN: [id number]    YOB: 1965  Age: 46 y.o. Sex: female       Cardiovascular Function/Vital Signs  Visit Vitals    /85    Pulse 88    Temp 36.8 °C (98.3 °F)    Resp 15    Ht 5' 2\" (1.575 m)    Wt 78.4 kg (172 lb 13.5 oz)    SpO2 97%    BMI 31.61 kg/m2       Patient is status post general anesthesia for Procedure(s):  RIGHT BREAST RECONSTRUCTION WITH BREAST IMPLANT, LEFT BREAST AUGMENTATION AND MASTOPEXY  BREAST MASTOPEXY. Nausea/Vomiting: None    Postoperative hydration reviewed and adequate. Pain:  Pain Scale 1: Numeric (0 - 10) (02/10/17 1040)  Pain Intensity 1: 3 (02/10/17 1040)   Managed    Neurological Status:   Neuro (WDL): Within Defined Limits (02/10/17 7953)  Neuro  Neurologic State: Alert;Drowsy (02/10/17 1045)  Orientation Level: Appropriate for age;Oriented X4 (02/10/17 0681)  Cognition: Appropriate decision making (02/10/17 9259)  Speech: Appropriate for age;Clear (02/10/17 0634)  LUE Motor Response: Purposeful (02/10/17 0634)  LLE Motor Response: Purposeful (02/10/17 0634)  RUE Motor Response: Purposeful (02/10/17 1260)  RLE Motor Response: Purposeful (02/10/17 3747)   At baseline    Mental Status and Level of Consciousness: Arousable    Pulmonary Status:   O2 Device: Room air (02/10/17 1048)   Adequate oxygenation and airway patent    Complications related to anesthesia: None    Post-anesthesia assessment completed.  No concerns    Signed By: Edson Collado MD     February 10, 2017

## 2017-02-10 NOTE — IP AVS SNAPSHOT
Current Discharge Medication List  
  
Take these medications at their scheduled times Dose & Instructions Dispensing Information Comments Morning Noon Evening Bedtime ALLEGRA 180 mg tablet Generic drug:  fexofenadine Your next dose is: Today, Tomorrow Other:  ____________ Dose:  180 mg Take 180 mg by mouth daily. Refills:  0  
     
   
   
   
  
 cephALEXin 500 mg capsule Commonly known as:  Merdis Perone Your next dose is: Today, Tomorrow Other:  ____________ Dose:  500 mg Take 1 Cap by mouth four (4) times daily for 5 days. Quantity:  20 Cap Refills:  0  
     
   
   
   
  
 mometasone 50 mcg/actuation nasal spray Commonly known as:  Adolph Pelt Your next dose is: Today, Tomorrow Other:  ____________ Dose:  2 Spray 2 Sprays by Both Nostrils route daily. Refills:  0  
     
   
   
   
  
 omeprazole 20 mg capsule Commonly known as:  PRILOSEC Your next dose is: Today, Tomorrow Other:  ____________ Dose:  20 mg Take 20 mg by mouth daily. Refills:  0  
     
   
   
   
  
 tamoxifen 20 mg tablet Commonly known as:  NOLVADEX Your next dose is: Today, Tomorrow Other:  ____________ Dose:  20 mg Take 20 mg by mouth daily. Refills:  0 Take these medications as needed Dose & Instructions Dispensing Information Comments Morning Noon Evening Bedtime ALPRAZolam 0.25 mg tablet Commonly known as:  Sharyon Kida Your next dose is: Today, Tomorrow Other:  ____________ Dose:  0.25 mg Take 1 Tab by mouth three (3) times daily as needed for Anxiety. Max Daily Amount: 0.75 mg. Quantity:  25 Tab Refills:  0 HYDROmorphone 2 mg tablet Commonly known as:  DILAUDID Your next dose is: Today, Tomorrow Other:  ____________ Dose:  2 mg Take 1 Tab by mouth every four (4) hours as needed for Pain. Max Daily Amount: 12 mg. Quantity:  40 Tab Refills:  0  
     
   
   
   
  
 promethazine 12.5 mg tablet Commonly known as:  PHENERGAN Your next dose is: Today, Tomorrow Other:  ____________ Dose:  12.5 mg Take 1 Tab by mouth every six (6) hours as needed for Nausea. Quantity:  10 Tab Refills:  2  
     
   
   
   
  
 traMADol 50 mg tablet Commonly known as:  ULTRAM  
   
Your next dose is: Today, Tomorrow Other:  ____________ Dose:  50 mg Take 50 mg by mouth every six (6) hours as needed for Pain. Indications: Pain Refills:  0 VOLTAREN 1 % Gel Generic drug:  diclofenac Your next dose is: Today, Tomorrow Other:  ____________ Dose:  4 g Apply 4 g to affected area four (4) times daily as needed. Refills:  0 Where to Get Your Medications Information about where to get these medications is not yet available ! Ask your nurse or doctor about these medications  
  cephALEXin 500 mg capsule HYDROmorphone 2 mg tablet  
 promethazine 12.5 mg tablet

## 2017-02-10 NOTE — PERIOP NOTES
For dc home. Vswnl. Reports pain 3/10 which is tolerable per pt. Denies nausea. dsgs to bilateral chest d&i. Surgical bra in place. Went over [employer] instructions including Rx and f/up. Verbalized understanding. dc'd home.

## 2017-04-28 ENCOUNTER — OFFICE VISIT (OUTPATIENT)
Dept: HEMATOLOGY | Age: 52
End: 2017-04-28

## 2017-04-28 ENCOUNTER — OFFICE VISIT (OUTPATIENT)
Dept: SURGERY | Age: 52
End: 2017-04-28

## 2017-04-28 VITALS
OXYGEN SATURATION: 98 % | TEMPERATURE: 97.8 F | WEIGHT: 172.8 LBS | HEIGHT: 62 IN | DIASTOLIC BLOOD PRESSURE: 99 MMHG | RESPIRATION RATE: 16 BRPM | BODY MASS INDEX: 31.8 KG/M2 | HEART RATE: 86 BPM | SYSTOLIC BLOOD PRESSURE: 135 MMHG

## 2017-04-28 VITALS
HEART RATE: 95 BPM | TEMPERATURE: 98.3 F | DIASTOLIC BLOOD PRESSURE: 95 MMHG | WEIGHT: 172.25 LBS | SYSTOLIC BLOOD PRESSURE: 143 MMHG | BODY MASS INDEX: 31.7 KG/M2 | OXYGEN SATURATION: 96 % | HEIGHT: 62 IN

## 2017-04-28 DIAGNOSIS — Z01.419 WOMEN'S ANNUAL ROUTINE GYNECOLOGICAL EXAMINATION: Primary | ICD-10-CM

## 2017-04-28 DIAGNOSIS — Z85.3 PERSONAL HISTORY OF BREAST CANCER: ICD-10-CM

## 2017-04-28 DIAGNOSIS — R74.8 ELEVATED LIVER ENZYMES: Primary | ICD-10-CM

## 2017-04-28 DIAGNOSIS — Z90.710 H/O TOTAL HYSTERECTOMY: ICD-10-CM

## 2017-04-28 PROBLEM — Z98.890 S/P BREAST RECONSTRUCTION: Status: ACTIVE | Noted: 2017-04-28

## 2017-04-28 NOTE — PROGRESS NOTES
SUBJECTIVE: Shira Erazo is a 46 y.o. female who presents with desire for annual well woman exam. Patient's last menstrual period was 2014. No Known Allergies    Past Medical History:   Diagnosis Date    Arthritis     lower back    Breast cancer, right ([address] 2015    Chronic pain     Lumbar Pain    GERD (gastroesophageal reflux disease)     Hypercholesterolemia     Nausea & vomiting     nausea after procedure, pt states she does have motion sickness       Past Surgical History:   Procedure Laterality Date    HX BREAST LUMPECTOMY Right 2016    RIGHT CENTRAL LUMPECTOMY (TYLECTOMY) & SENTINEL LYMPH NODE BIOPSY performed by Sanjeev Plasencia MD at [facility]    HX BREAST RECONSTRUCTION Right 2/10/2017    RIGHT BREAST RECONSTRUCTION WITH BREAST IMPLANT, LEFT BREAST AUGMENTATION AND MASTOPEXY performed by Asuncion Segura MD at [facility] MA[facility] OR    HX BREAST REDUCTION Left 2/10/2017    BREAST MASTOPEXY performed by Asuncion Segura MD at [facility] MA[facility] OR    HX GYN      ectopic preg and ovarian cystectomy    HX HYSTERECTOMY      HX ORTHOPAEDIC Bilateral     carpel tunnel release both wrists 2 weeks apart       OB History    Grav Para Term  Abortions TAB SAB Ect Mult Living    4 3 3 0 1 0 0 1 0 3        Family History   Problem Relation Age of Onset    Diabetes Mother     Heart Disease Mother      myocardial infarction    Heart Disease Father      myocardial infarction    Breast Cancer Paternal Aunt      Social History     Social History    Marital status:      Spouse name: N/A    Number of children: N/A    Years of education: N/A     Occupational History    Not on file.      Social History Main Topics    Smoking status: Never Smoker    Smokeless tobacco: Never Used    Alcohol use Yes      Comment: 1 DRINK PER MONTH    Drug use: No    Sexual activity: Yes     Partners: Male     Other Topics Concern    Not on file     Social History Narrative     Current Outpatient Prescriptions   Medication Sig Dispense Refill    traMADol (ULTRAM) 50 mg tablet Take 50 mg by mouth every six (6) hours as needed for Pain. Indications: Pain      tamoxifen (NOLVADEX) 20 mg tablet Take 20 mg by mouth daily.  ALPRAZolam (XANAX) 0.25 mg tablet Take 1 Tab by mouth three (3) times daily as needed for Anxiety. Max Daily Amount: 0.75 mg. 25 Tab 0    diclofenac (VOLTAREN) 1 % topical gel Apply 4 g to affected area four (4) times daily as needed.  fexofenadine (ALLEGRA) 180 mg tablet Take 180 mg by mouth daily.  mometasone (NASONEX) 50 mcg/actuation nasal spray 2 Sprays by Both Nostrils route daily.  omeprazole (PRILOSEC) 20 mg capsule Take 20 mg by mouth daily.  promethazine (PHENERGAN) 12.5 mg tablet Take 1 Tab by mouth every six (6) hours as needed for Nausea. 10 Tab 2       Review of Systems:   Constitutional: No weight change, chills or fever, anorexia, weakness or sleep disturbance . Cardiovascular: No chest pain, shortness of breath, or palpitations . Respiratory: No cough, shortness of breath, hemoptysis, or orthopnea . Neurologic: No syncope, headaches or seizures . Hematologic: No easy bruising or unusual bleeding . Psychiatric: No insomnia, confusion, depression, or anxiety . GI:No nausea and vomiting, diarrhea or constipation  . : See HPI . Musculoskeletal: No joint pain or muscle pain . Endocrine: No polydipsia, polyuria, cold intolerance, excessive fatigue, or sleep disturbance . Integumentary: No breast pain, lumps, nipple discharge, or axillary lumps .     Objective:     Visit Vitals    BP (!) 135/99    Pulse 86    Temp 97.8 °F (36.6 °C) (Oral)    Resp 16    Ht 5' 2\" (1.575 m)    Wt 172 lb 12.8 oz (78.4 kg)    LMP 04/11/2014    SpO2 98%    BMI 31.61 kg/m2       General:  alert, cooperative, no distress, appears stated age   Skin:  no rash or abnormalities   Eyes: negative   Mouth: MMM no lesions   Lymph Nodes:  Cervical, supraclavicular, and axillary nodes normal.   Breast Exam: normal appearance, no masses or tenderness, except for mastectomy right breast with reconstruction and prosthesis. Lungs:  clear to auscultation bilaterally   Heart:  regular rate and rhythm   Abdomen: soft, non-tender. Bowel sounds normal. No masses,  no organomegaly   Back:  Costovertebral angle tenderness absent   Genitourinary: Pelvic exam: VULVA: normal appearing vulva with no masses, tenderness or lesions, VAGINA: normal appearing vagina with normal color and discharge, no lesions, CERVIX: surgically absent, UTERUS: surgicall absent, ADNEXA: normal adnexa in size, nontender and no masses    Extremities:  extremities normal, atraumatic, no cyanosis or edema   Neurologic:  negative   Psychiatric:  non focal     ASSESSMENT:      ICD-10-CM ICD-9-CM    1. Women's annual routine gynecological examination Z01.419 V72.31    2. Personal history of breast cancer Z85.3 V10.3    3. H/O total hysterectomy Z90.710 V88.01         Follow-up Disposition:  Return in about 1 year (around 4/28/2018), or if symptoms worsen or fail to improve.       Copy sent via [employer] to:    Dr. Norm Suarez

## 2017-04-28 NOTE — MR AVS SNAPSHOT
Visit Information Date & Time Provider Department Dept. Phone Encounter #  
 4/28/2017  9:15 AM Benja Sullivan, [facility] [address] [id number] Follow-up Instructions Return in about 1 year (around 4/28/2018), or if symptoms worsen or fail to improve. Your Appointments 4/28/2017 12:30 PM  
New Patient with Norlene Leyden, MD  
[facility] ([address]) Appt Note: np swollen Liver referred by Dr. Juni Ferguson [phone]; np swollen Liver referred by Dr. Juni Ferguson [phone] 200 [facility] [id number] [facility] [address]  
59 [facility] [place] [address] Upcoming Health Maintenance Date Due Hepatitis C Screening 1965 Pneumococcal 19-64 Highest Risk (1 of 3 - PCV13) 3/8/1984 DTaP/Tdap/Td series (1 - Tdap) 3/8/1986 FOBT Q 1 YEAR AGE 50-75 3/8/2015 INFLUENZA AGE 9 TO ADULT 8/1/2016 BREAST CANCER SCRN MAMMOGRAM 10/14/2018 PAP AKA CERVICAL CYTOLOGY 12/2/2018 Allergies as of 4/28/2017  Review Complete On: 4/28/2017 By: Benja Sullivan MD  
 No Known Allergies Current Immunizations  Never Reviewed No immunizations on file. Not reviewed this visit You Were Diagnosed With   
  
 Codes Comments Women's annual routine gynecological examination    -  Primary ICD-10-CM: M70.634 ICD-9-CM: V72.31 Personal history of breast cancer     ICD-10-CM: Z85.3 ICD-9-CM: V10.3 H/O total hysterectomy     ICD-10-CM: Z90.710 ICD-9-CM: V88.01 Vitals BP Pulse Temp Resp Height(growth percentile) Weight(growth percentile) (!) 135/99 86 97.8 °F (36.6 °C) (Oral) 16 5' 2\" (1.575 m) 172 lb 12.8 oz (78.4 kg) LMP SpO2 BMI OB Status Smoking Status 04/11/2014 98% 31.61 kg/m2 Hysterectomy Never Smoker Vitals History BMI and BSA Data Body Mass Index Body Surface Area  
 31.61 kg/m 2 1.85 m 2 Preferred Pharmacy Pharmacy Name Phone [facility][id number] [phone] [facility] [phone] Your Updated Medication List  
  
   
This list is accurate as of: 4/28/17  9:58 AM.  Always use your most recent med list. ALLEGRA 180 mg tablet Generic drug:  fexofenadine Take 180 mg by mouth daily. ALPRAZolam 0.25 mg tablet Commonly known as:  Chryl Rumble Take 1 Tab by mouth three (3) times daily as needed for Anxiety. Max Daily Amount: 0.75 mg.  
  
 mometasone 50 mcg/actuation nasal spray Commonly known as:  NASONEX  
2 Sprays by Both Nostrils route daily. omeprazole 20 mg capsule Commonly known as:  PRILOSEC Take 20 mg by mouth daily. promethazine 12.5 mg tablet Commonly known as:  PHENERGAN Take 1 Tab by mouth every six (6) hours as needed for Nausea. tamoxifen 20 mg tablet Commonly known as:  NOLVADEX Take 20 mg by mouth daily. traMADol 50 mg tablet Commonly known as:  ULTRAM  
Take 50 mg by mouth every six (6) hours as needed for Pain. Indications: Pain VOLTAREN 1 % Gel Generic drug:  diclofenac Apply 4 g to affected area four (4) times daily as needed. Follow-up Instructions Return in about 1 year (around 4/28/2018), or if symptoms worsen or fail to improve. [facility] & HEALTH SERVICES! Dear Calos Gambino: Thank you for requesting a Polleverywhere account. Our records indicate that you already have an active Polleverywhere account. You can access your account anytime at [email or web address] Did you know that you can access your hospital and ER discharge instructions at any time in Polleverywhere? You can also review all of your test results from your hospital stay or ER visit. Additional Information If you have questions, please visit the Frequently Asked Questions section of the Polleverywhere website at [email or web address] Remember, MyChart is NOT to be used for urgent needs. For medical emergencies, dial 911. Now available from your iPhone and Android! Please provide this summary of care documentation to your next provider. Your primary care clinician is listed as Ricarda Benavidez. If you have any questions after today's visit, please call [phone].

## 2017-04-28 NOTE — MR AVS SNAPSHOT
Visit Information Date & Time Provider Department Dept. Phone Encounter #  
 4/28/2017 12:30 PM Sung Isidro MD [facility] [id number] Follow-up Instructions Return in about 4 weeks (around 5/26/2017) for NP. Your Appointments 6/2/2017 10:30 AM  
Follow Up with MARI Hughes [facility] [address] ([address]) Appt Note: Follow up 200 [facility] [id number] 1400 [address] [place] [address]  
[phone]  
  
   
 200 [facility] 505 [facility] [address]  
  
    
 5/4/2018  9:00 AM  
ROUTINE CARE with Justin Buckner MD  
[facility] [address]) Appt Note: Well Woman $0CP SL 4.28.17  
 305 [facility]. [place] [phone] [address] [place] [address] Upcoming Health Maintenance Date Due Hepatitis C Screening 1965 Pneumococcal 19-64 Highest Risk (1 of 3 - PCV13) 3/8/1984 DTaP/Tdap/Td series (1 - Tdap) 3/8/1986 FOBT Q 1 YEAR AGE 50-75 3/8/2015 INFLUENZA AGE 9 TO ADULT 8/1/2016 BREAST CANCER SCRN MAMMOGRAM 10/14/2018 PAP AKA CERVICAL CYTOLOGY 12/2/2018 Allergies as of 4/28/2017  Review Complete On: 4/28/2017 By: Sharmila Martinez LPN No Known Allergies Current Immunizations  Never Reviewed No immunizations on file. Not reviewed this visit You Were Diagnosed With   
  
 Codes Comments Elevated liver enzymes    -  Primary ICD-10-CM: R74.8 ICD-9-CM: 790.5 Vitals BP Pulse Temp Height(growth percentile) Weight(growth percentile) LMP  
 (!) 143/95 95 98.3 °F (36.8 °C) (Tympanic) 5' 2\" (1.575 m) 172 lb 4 oz (78.1 kg) 04/11/2014 SpO2 BMI OB Status Smoking Status 96% 31.5 kg/m2 Hysterectomy Never Smoker BMI and BSA Data Body Mass Index Body Surface Area 31.5 kg/m 2 1.85 m 2 Preferred Pharmacy Pharmacy Name Phone [facility] #[id number] [facility] [phone] Your Updated Medication List  
  
   
This list is accurate as of: 4/28/17  1:25 PM.  Always use your most recent med list. ALLEGRA 180 mg tablet Generic drug:  fexofenadine Take 180 mg by mouth daily. ALPRAZolam 0.25 mg tablet Commonly known as:  Sunshine Barks Take 1 Tab by mouth three (3) times daily as needed for Anxiety. Max Daily Amount: 0.75 mg.  
  
 mometasone 50 mcg/actuation nasal spray Commonly known as:  NASONEX  
2 Sprays by Both Nostrils route daily. omeprazole 20 mg capsule Commonly known as:  PRILOSEC Take 20 mg by mouth daily. promethazine 12.5 mg tablet Commonly known as:  PHENERGAN Take 1 Tab by mouth every six (6) hours as needed for Nausea. tamoxifen 20 mg tablet Commonly known as:  NOLVADEX Take 20 mg by mouth daily. traMADol 50 mg tablet Commonly known as:  ULTRAM  
Take 50 mg by mouth every six (6) hours as needed for Pain. Indications: Pain VOLTAREN 1 % Gel Generic drug:  diclofenac Apply 4 g to affected area four (4) times daily as needed. We Performed the Following ACTIN (SMOOTH MUSCLE) ANTIBODY V931284 CPT(R)] ALPHA-1-ANTITRYPSIN, TOTAL [41555 CPT(R)] ANTINUCLEAR ANTIBODIES, IFA [id number] CPT(R)] CERULOPLASMIN [id number] CPT(R)] FERRITIN [60949 CPT(R)] HCV AB W/REFLEX VERIFICATION [[id number] Custom] HEP A AB, TOTAL [id number] CPT(R)] HEP B SURFACE AB [id number] CPT(R)] HEP B SURFACE AG [id number] CPT(R)] HEPATIC FUNCTION PANEL [47990 CPT(R)] HEPATITIS B CORE AB, TOTAL [id number] CPT(R)] IRON PROFILE [id number] CPT(R)] METABOLIC PANEL, BASIC [48791 CPT(R)] Follow-up Instructions Return in about 4 weeks (around 5/26/2017) for NP. Introducing [facility] & HEALTH SERVICES! Dear Sumit Moreno: Thank you for requesting a Own Product[employer] account.   Our records indicate that you already have an active [employer] MCI account. You can access your account anytime at [email or web address] Did you know that you can access your hospital and ER discharge instructions at any time in [employer] MCI? You can also review all of your test results from your hospital stay or ER visit. Additional Information If you have questions, please visit the Frequently Asked Questions section of the [employer] website at [email or web address] Remember, [employer]CI is NOT to be used for urgent needs. For medical emergencies, dial 911. Now available from your iPhone and Android! Please provide this summary of care documentation to your next provider. Your primary care clinician is listed as Ricarda Benavidez. If you have any questions after today's visit, please call [phone].

## 2017-04-28 NOTE — PROGRESS NOTES
Sarah Mcdonald MD, NUHA Roman PA-C Arlander Rear, MD, Anupama Garza, MD Sandra Roblero NP Joyice Daring, NP        [address] [facility], [address] [address]  22.     [phone]     FAX: 411 [address], [address] [place] [address], [address]     [phone]     FAX: [phone]       Patient Care Team:  Ariana Tena MD as PCP - General (Internal Medicine)  Vita Cruz MD as Physician (Gynecology)  Jeancarlos Roth MD as Surgeon (General Surgery)  Teresa Guzman MD as Physician (Radiation Oncology)  Bill Ledezma MD as Physician (Hematology and Oncology)  Analilia Alvares NP as Nurse Practitioner (Oncology)  Lisa Esparza MD as Surgeon (Plastic Surgery)      Problem List  Date Reviewed: 4/28/2017          Codes Class Noted    S/P breast reconstruction ICD-10-CM: U68.97  ICD-9-CM: V43.82  4/28/2017        Breast cancer, [facility]) ICD-10-CM: C50.911  ICD-9-CM: 174.9  12/2/2015    Overview Signed 4/28/2017  1:02 PM by Veronica Smith MD     Lumpectomy, XRT 2016             Weight gain ICD-10-CM: R63.5  ICD-9-CM: 783.1  12/17/2014        H/O total hysterectomy ICD-10-CM: Z90.710  ICD-9-CM: V88.01  6/17/2014        Arthritis ICD-10-CM: M19.90  ICD-9-CM: 716.90  Unknown    Overview Signed 1/14/2014  3:04 PM by Vita Cruz MD     lower back             Hemorrhoids ICD-10-CM: P36.4  ICD-9-CM: 455.6  1/14/2014                The physicians listed above have asked me to see Salina Rae in consultation regarding elevated liver enzymes and its management. All medical records sent by the referring physicians were reviewed including imaging studies     The patient is a 46 y.o.  female who was first noted to have abnormalities in liver enzymes in 1/2016.       Serologic evaluation for markers of chronic liver disease were either not performed or available to me. Ultrasound of the liver was performed in 1/2017. The results of the imaging suggested chronic liver disease. An assessment of liver fibrosis with biopsy or elastography has not been performed. The patient had not started any new medications within 3 months preceeding the elevation in liver chemistries. The most recent laboratory studies indicate that the liver transaminases are elevated, ALP is normal, tests of hepatic synthetic and metabolic function are normal, and the platelet count is normal.    The patient has no symptoms which could be attributed to the liver disorder. The patient completes all daily activities without any functional limitations. The patient has not experienced fatigue, pain in the right side over the liver,       ALLERGIES  No Known Allergies    MEDICATIONS  Current Outpatient Prescriptions   Medication Sig    traMADol (ULTRAM) 50 mg tablet Take 50 mg by mouth every six (6) hours as needed for Pain. Indications: Pain    tamoxifen (NOLVADEX) 20 mg tablet Take 20 mg by mouth daily.  ALPRAZolam (XANAX) 0.25 mg tablet Take 1 Tab by mouth three (3) times daily as needed for Anxiety. Max Daily Amount: 0.75 mg.    diclofenac (VOLTAREN) 1 % topical gel Apply 4 g to affected area four (4) times daily as needed.  fexofenadine (ALLEGRA) 180 mg tablet Take 180 mg by mouth daily.  mometasone (NASONEX) 50 mcg/actuation nasal spray 2 Sprays by Both Nostrils route daily.  omeprazole (PRILOSEC) 20 mg capsule Take 20 mg by mouth daily.  promethazine (PHENERGAN) 12.5 mg tablet Take 1 Tab by mouth every six (6) hours as needed for Nausea. No current facility-administered medications for this visit. SYSTEM REVIEW NOT RELATED TO LIVER DISEASE OR REVIEWED ABOVE:  Constitution systems: Negative for fever, chills, weight gain, weight loss.    Eyes: Negative for visual changes. ENT: Negative for sore throat, painful swallowing. Respiratory: Negative for cough, hemoptysis, SOB. Cardiology: Negative for chest pain, palpitations. GI:  Negative for constipation or diarrhea. : Negative for urinary frequency, dysuria, hematuria, nocturia. Skin: Negative for rash. Hematology: Negative for easy bruising, blood clots. Musculo-skelatal: Negative for back pain, muscle pain, weakness. Neurologic: Negative for headaches, dizziness, vertigo, memory problems not related to HE. Psychology: Negative for anxiety, depression. FAMILY HISTORY:  The father  of MI. The mother  of DM, heart disease. There is no family history of liver disease. SOCIAL HISTORY:  The patient is . The patient has 3 children,   The patient has never used tobacco products. The patient has never consumed significant amounts of alcohol. The patient currently works full time as a CNA. PHYSICAL EXAMINATION:  BP (!) 143/95  Pulse 95  Temp 98.3 °F (36.8 °C) (Tympanic)   Ht 5' 2\" (1.575 m)  Wt 172 lb 4 oz (78.1 kg)  LMP 2014  SpO2 96%  BMI 31.5 kg/m2  General: No acute distress. Eyes: Sclera anicteric. ENT: No oral lesions. Thyroid normal.  Nodes: No adenopathy. Skin: No spider angiomata. No jaundice. No palmar erythema. Respiratory: Lungs clear to auscultation. Cardiovascular: Regular heart rate. No murmurs. No JVD. Abdomen: Soft non-tender. Liver size normal to percussion/palpation. Spleen not palpable. No obvious ascites. Extremities: No edema. No muscle wasting. No gross arthritic changes. Neurologic: Alert and oriented. Cranial nerves grossly intact. No asterixis.     LABORATORY STUDIES:  Liver [facility] [place] of [address] [place] [address] & Units 2016   WBC 3.6 - 11.0 K/uL 6.3   HGB 11.5 - 16.0 g/dL 14.4    - 400 K/uL 280   AST 15 - 37 U/L 38 (H)   ALT 12 - 78 U/L 85 (H)   Alk Phos 45 - 117 U/L 94   Bili, Total 0.2 - 1.0 MG/DL 0.4   Albumin 3.5 - 5.0 g/dL 4.2   BUN 6 - 20 MG/DL 12   Creat 0.55 - 1.02 MG/DL 0.58   Na 136 - 145 mmol/L 138   K 3.5 - 5.1 mmol/L 4.0   Cl 97 - 108 mmol/L 104   CO2 21 - 32 mmol/L 26   Glucose 65 - 100 mg/dL 96     SEROLOGIES:  Not available or performed. Testing was performed today. LIVER HISTOLOGY:  Not available or performed    ENDOSCOPIC PROCEDURES:  Not available or performed    RADIOLOGY:  1/2017. Ultrasound of liver. Echogenic consistent with chronic liver disease. No liver mass lesions. No dilated bile ducts. No ascites. OTHER TESTING:  Not available or performed    ASSESSMENT AND PLAN:  Persistent elevation in liver transaminases of unclear etiology at this time. Liver function is normal.  The platelet count is normal.      Based upon laboratory studies and imaging the patient may have advanced liver disease     Serologic testing to help define the cause of the laboratory abnormality were ordered. The most likely causes for the liver chemistry abnormalities were discussed with the patient and include fatty liver disease,     Will perform laboratory testing to monitor liver function and degree of liver injury. This included BMP, hepatic panel, CBC with platelet count, INR. The need to perform a liver biopsy to help determine the cause and severity of the liver test abnormalities was discussed. The risks of performing the liver biopsy including pain, puncture of the lung, gallbladder, intestine or kidney and bleeding were discussed. Will defer liver biopsy for now. The patient was directed to continue all current medications at the current dosages. There are no contraindications for the patient to take any medications that are necessary for treatment of other medical issues. The patient was counseled regarding alcohol consumption. The need for vaccination against viral hepatitis A and B will be assessed with serologic and instituted as appropriate.     All of the above issues were discussed with the patient. All questions were answered. The patient expressed a clear understanding of the above. Follow-up Edmar Leonard 32 in for Fibroscan, to review all data and determine the treatment plan.     Wally Corona MD  Liver [place] [address] [address].  [phone]

## 2017-04-29 LAB
A1AT SERPL-MCNC: 182 MG/DL (ref 90–200)
ALBUMIN SERPL-MCNC: 4.6 G/DL (ref 3.5–5.5)
ALP SERPL-CCNC: 107 IU/L (ref 39–117)
ALT SERPL-CCNC: 105 IU/L (ref 0–32)
AST SERPL-CCNC: 60 IU/L (ref 0–40)
BILIRUB DIRECT SERPL-MCNC: 0.1 MG/DL (ref 0–0.4)
BILIRUB SERPL-MCNC: 0.2 MG/DL (ref 0–1.2)
BUN SERPL-MCNC: 10 MG/DL (ref 6–24)
BUN/CREAT SERPL: 21 (ref 9–23)
CALCIUM SERPL-MCNC: 9.2 MG/DL (ref 8.7–10.2)
CERULOPLASMIN SERPL-MCNC: 42.3 MG/DL (ref 19–39)
CHLORIDE SERPL-SCNC: 102 MMOL/L (ref 96–106)
CO2 SERPL-SCNC: 24 MMOL/L (ref 18–29)
COMMENT, 144067: NORMAL
CREAT SERPL-MCNC: 0.47 MG/DL (ref 0.57–1)
FERRITIN SERPL-MCNC: 192 NG/ML (ref 15–150)
GLUCOSE SERPL-MCNC: 100 MG/DL (ref 65–99)
HAV AB SER QL IA: POSITIVE
HBV CORE AB SERPL QL IA: NEGATIVE
HBV SURFACE AB SER QL: NON REACTIVE
HBV SURFACE AG SERPL QL IA: NEGATIVE
HCV AB S/CO SERPL IA: <0.1 S/CO RATIO (ref 0–0.9)
IRON SATN MFR SERPL: 25 % (ref 15–55)
IRON SERPL-MCNC: 90 UG/DL (ref 27–159)
POTASSIUM SERPL-SCNC: 4.3 MMOL/L (ref 3.5–5.2)
PROT SERPL-MCNC: 7.3 G/DL (ref 6–8.5)
SODIUM SERPL-SCNC: 142 MMOL/L (ref 134–144)
TIBC SERPL-MCNC: 364 UG/DL (ref 250–450)
UIBC SERPL-MCNC: 274 UG/DL (ref 131–425)

## 2017-04-30 LAB — ACTIN IGG SERPL-ACNC: 13 UNITS (ref 0–19)

## 2017-05-01 LAB — ANA TITR SER IF: NEGATIVE {TITER}

## 2017-10-28 ENCOUNTER — HOSPITAL ENCOUNTER (OUTPATIENT)
Dept: MAMMOGRAPHY | Age: 52
Discharge: HOME | End: 2017-10-28
Attending: INTERNAL MEDICINE

## 2017-10-28 DIAGNOSIS — Z12.31 VISIT FOR SCREENING MAMMOGRAM: ICD-10-CM

## 2017-10-31 ENCOUNTER — HOSPITAL ENCOUNTER (OUTPATIENT)
Dept: MAMMOGRAPHY | Age: 52
Discharge: HOME | End: 2017-10-31
Attending: INTERNAL MEDICINE
Payer: COMMERCIAL

## 2017-10-31 DIAGNOSIS — Z85.3 HISTORY OF BREAST CANCER: ICD-10-CM

## 2017-10-31 PROCEDURE — 77066 DX MAMMO INCL CAD BI: CPT

## 2017-12-29 ENCOUNTER — TELEPHONE (OUTPATIENT)
Dept: HEMATOLOGY | Age: 52
End: 2017-12-29

## 2017-12-29 NOTE — TELEPHONE ENCOUNTER
Patient left voice mail asking if she can get her lab work prior to her upcoming appointment in January. Attempted to contact patient to discuss - her voice mailbox is full.

## 2018-01-09 ENCOUNTER — OFFICE VISIT (OUTPATIENT)
Dept: HEMATOLOGY | Age: 53
End: 2018-01-09

## 2018-01-09 ENCOUNTER — OFFICE VISIT (OUTPATIENT)
Dept: INTERNAL MEDICINE CLINIC | Age: 53
End: 2018-01-09

## 2018-01-09 VITALS
TEMPERATURE: 97.7 F | OXYGEN SATURATION: 98 % | DIASTOLIC BLOOD PRESSURE: 90 MMHG | BODY MASS INDEX: 25.3 KG/M2 | HEART RATE: 85 BPM | SYSTOLIC BLOOD PRESSURE: 128 MMHG | WEIGHT: 142.8 LBS

## 2018-01-09 VITALS
OXYGEN SATURATION: 98 % | WEIGHT: 143 LBS | HEIGHT: 63 IN | SYSTOLIC BLOOD PRESSURE: 118 MMHG | HEART RATE: 82 BPM | BODY MASS INDEX: 25.34 KG/M2 | DIASTOLIC BLOOD PRESSURE: 79 MMHG | RESPIRATION RATE: 16 BRPM | TEMPERATURE: 97.8 F

## 2018-01-09 DIAGNOSIS — R73.03 PREDIABETES: ICD-10-CM

## 2018-01-09 DIAGNOSIS — E78.00 PURE HYPERCHOLESTEROLEMIA: ICD-10-CM

## 2018-01-09 DIAGNOSIS — C50.911 MALIGNANT NEOPLASM OF RIGHT BREAST IN FEMALE, ESTROGEN RECEPTOR POSITIVE, UNSPECIFIED SITE OF BREAST (HCC): ICD-10-CM

## 2018-01-09 DIAGNOSIS — Z23 ENCOUNTER FOR IMMUNIZATION: ICD-10-CM

## 2018-01-09 DIAGNOSIS — E66.3 OVERWEIGHT (BMI 25.0-29.9): ICD-10-CM

## 2018-01-09 DIAGNOSIS — Z17.0 MALIGNANT NEOPLASM OF RIGHT BREAST IN FEMALE, ESTROGEN RECEPTOR POSITIVE, UNSPECIFIED SITE OF BREAST (HCC): ICD-10-CM

## 2018-01-09 DIAGNOSIS — Z00.00 PHYSICAL EXAM, ANNUAL: Primary | ICD-10-CM

## 2018-01-09 DIAGNOSIS — R74.8 ELEVATED LIVER ENZYMES: Primary | ICD-10-CM

## 2018-01-09 DIAGNOSIS — K21.9 GASTROESOPHAGEAL REFLUX DISEASE WITHOUT ESOPHAGITIS: ICD-10-CM

## 2018-01-09 RX ORDER — RANITIDINE 150 MG/1
150 TABLET, FILM COATED ORAL 2 TIMES DAILY
Qty: 60 TAB | Refills: 6 | Status: SHIPPED | OUTPATIENT
Start: 2018-01-09 | End: 2018-07-01 | Stop reason: SDUPTHER

## 2018-01-09 NOTE — PROGRESS NOTES
1. Have you been to the ER, urgent care clinic since your last visit? Hospitalized since your last visit? No    2. Have you seen or consulted any other health care providers outside of the [address] [place] [address] since your last visit? Include any pap smears or colon screening.  No     Chief Complaint   Patient presents with    Follow-up     fibroscan      Visit Vitals    /90 (BP 1 Location: Left arm, BP Patient Position: Sitting)    Pulse 85    Temp 97.7 °F (36.5 °C) (Tympanic)    Wt 142 lb 12.8 oz (64.8 kg)    SpO2 98%    BMI 25.3 kg/m2     PHQ over the last two weeks 1/9/2018   Little interest or pleasure in doing things Not at all   Feeling down, depressed or hopeless Not at all   Total Score PHQ 2 0     Learning Assessment 1/9/2018   PRIMARY LEARNER Patient   PRIMARY LANGUAGE ENGLISH   LEARNER PREFERENCE PRIMARY LISTENING   ANSWERED BY patient   RELATIONSHIP SELF

## 2018-01-09 NOTE — MR AVS SNAPSHOT
Visit Information Date & Time Provider Department Dept. Phone Encounter #  
 1/9/2018 11:00 AM Ghada Phelps, [address] of [place] [address] [id number] Your Appointments 5/4/2018  9:00 AM  
ROUTINE CARE with Kaylyn Edmonds MD  
[facility]) Appt Note: Well Woman $0CP SL 4.28.17  
 [address] [id number] [address] [place] [address] [address] [address] Upcoming Health Maintenance Date Due Pneumococcal 19-64 Highest Risk (1 of 3 - PCV13) 3/8/1984 DTaP/Tdap/Td series (1 - Tdap) 3/8/1986 PAP AKA CERVICAL CYTOLOGY 12/2/2018 BREAST CANCER SCRN MAMMOGRAM 10/31/2019 COLONOSCOPY 1/1/2023 Allergies as of 1/9/2018  Review Complete On: 1/9/2018 By: Cheli Born No Known Allergies Current Immunizations  Reviewed on 1/9/2018 Name Date Influenza Vaccine 10/15/2017 Tdap 1/9/2018 Reviewed by Kitty Luo MD on 1/9/2018 at  9:32 AM  
 Reviewed by Kitty Luo MD on 1/9/2018 at  9:36 AM  
Vitals BP Pulse Temp Weight(growth percentile) LMP SpO2  
 128/90 (BP 1 Location: Left arm, BP Patient Position: Sitting) 85 97.7 °F (36.5 °C) (Tympanic) 142 lb 12.8 oz (64.8 kg) 04/11/2014 98% BMI OB Status Smoking Status 25.3 kg/m2 Hysterectomy Never Smoker BMI and BSA Data Body Mass Index Body Surface Area  
 25.3 kg/m 2 1.7 m 2 Preferred Pharmacy Pharmacy Name Phone [id number] [phone] Your Updated Medication List  
  
   
This list is accurate as of: 1/9/18 11:59 AM.  Always use your most recent med list.  
  
  
  
  
 raNITIdine 150 mg tablet Commonly known as:  ZANTAC Take 1 Tab by mouth two (2) times a day. tamoxifen 20 mg tablet Commonly known as:  NOLVADEX Take 20 mg by mouth daily. traMADol 50 mg tablet Commonly known as:  ULTRAM  
Take 50 mg by mouth every six (6) hours as needed for Pain. Indications: Pain VOLTAREN 1 % Gel Generic drug:  diclofenac Apply 4 g to affected area four (4) times daily as needed. Introducing [facility]! Dear Chely Waller: Thank you for requesting a [employer] account. Our records indicate that you already have an active [employer] account. You can access your account anytime at [email or web address] Did you know that you can access your hospital and ER discharge instructions at any time in [employer]? You can also review all of your test results from your hospital stay or ER visit. Additional Information If you have questions, please visit the Frequently Asked Questions section of the [employer] website at [email or web address] Remember, [employer] is NOT to be used for urgent needs. For medical emergencies, dial 911. Now available from your iPhone and Android! Please provide this summary of care documentation to your next provider. Your primary care clinician is listed as Larence Holguin. If you have any questions after today's visit, please call [phone].

## 2018-01-09 NOTE — PROGRESS NOTES
93 Brett Olvera MD, NUHA Barrera PA-C Bettyjane Hews, MD, MD Sandra Dumont NP Curlee Gola, NP        [address] [facility], [address] [address]  22.     [phone]     FAX: 411 [address], [address] [place] [address], [address] [facility] [address]     [phone]     FAX: [phone]       Patient Care Team:  Alan Eason MD as PCP - General (Internal Medicine)  Eliza Concepcion MD as Physician (Gynecology)  Jose D Goldman MD as Surgeon (General Surgery)  Mildred Mathews MD as Physician (Radiation Oncology)  Margaux Wu MD as Physician (Hematology and Oncology)  Barry Mckeon NP as Nurse Practitioner (Oncology)  Bala Hargrove MD as Surgeon (Plastic Surgery)  Sirena Belcher MD (Hepatology)      Problem List  Date Reviewed: 1/9/2018          Codes Class Noted    Prediabetes ICD-10-CM: R73.03  ICD-9-CM: 790.29  1/9/2018        S/P breast reconstruction ICD-10-CM: L16.52  ICD-9-CM: V43.82  4/28/2017        Breast cancer, right ([address] ICD-10-CM: C50.911  ICD-9-CM: 174.9  12/2/2015    Overview Signed 4/28/2017  1:02 PM by Sirena Belcher MD     Lumpectomy, XRT 2016             Weight gain ICD-10-CM: R63.5  ICD-9-CM: 783.1  12/17/2014        H/O total hysterectomy ICD-10-CM: Z90.710  ICD-9-CM: V88.01  6/17/2014        Arthritis ICD-10-CM: M19.90  ICD-9-CM: 716.90  Unknown    Overview Signed 1/14/2014  3:04 PM by Eliza Concepcion MD     lower back             Hemorrhoids ICD-10-CM: R08.1  ICD-9-CM: 455.6  1/14/2014              [facility] of elevated liver enzymes and to perform in-office fibroscan.   The active problem list, all pertinent past medical history, medications, radiologic findings and laboratory findings related to the liver disorder were reviewed with the patient. The patient is a 46 y.o.  female who was first noted to have abnormalities in liver enzymes in 1/2016 at the time of her breast cancer diagnosis. Patient has 1/2016 breast cancer surgery and has been on tamoxifen since 4/2016. Target of 5 years goal.    Serologic evaluation for markers of chronic liver disease were assessed at her initial office visit and are normal/negative. Ultrasound of the liver was performed in 1/2017. The results of the imaging suggested chronic liver disease, ?steatosis. An assessment of liver fibrosis with biopsy or elastography has not been performed. This is planned for today's office visit. The patient had not started any new medications within 3 months preceeding the elevation in liver chemistries. She was subsequently started on tamoxifen in 4/2017 after the initial finding of elevated enzymes. She has otherwise tolerated this medication well and is planned to continue with administration for the next 3 years. The most recent laboratory studies indicate that the liver transaminases are elevated, ALP is normal, tests of hepatic synthetic and metabolic function are normal, and the platelet count is normal.    The patient has no symptoms which could be attributed to the liver disorder. The patient completes all daily activities without any functional limitations. The patient has not experienced fatigue, pain in the right side over the liver. Of note, since her initial presentation to this office, she has had an intentional weight loss of 30# in 8 months. This has been achieved largely through carbohydrate-restricted diet and increased exercise. She is feeling well and is pleased that she has been able to maintain this goal.  She would like to hit 135 as her target weight. She has discontinued the use of all alcohol and acetaminophen products since her last office visit, 4/2017.     ALLERGIES  No Known Allergies    MEDICATIONS  Current Outpatient Prescriptions   Medication Sig    raNITIdine (ZANTAC) 150 mg tablet Take 1 Tab by mouth two (2) times a day.  traMADol (ULTRAM) 50 mg tablet Take 50 mg by mouth every six (6) hours as needed for Pain. Indications: Pain    tamoxifen (NOLVADEX) 20 mg tablet Take 20 mg by mouth daily.  diclofenac (VOLTAREN) 1 % topical gel Apply 4 g to affected area four (4) times daily as needed. No current facility-administered medications for this visit. SYSTEM REVIEW NOT RELATED TO LIVER DISEASE OR REVIEWED ABOVE:  Constitution systems: Negative for fever, chills. Weight loss of 30# over the past 8 months. Eyes: Negative for visual changes. ENT: Negative for sore throat, painful swallowing. Respiratory: Negative for cough, hemoptysis, SOB. Cardiology: Negative for chest pain, palpitations. GI:  Negative for constipation or diarrhea. : Negative for urinary frequency, dysuria, hematuria, nocturia. Skin: Negative for rash. Hematology: Negative for easy bruising, blood clots. Musculo-skeletal: Negative for back pain, muscle pain, weakness. Neurologic: Negative for headaches, dizziness, vertigo, memory problems not related to HE. Psychology: Negative for anxiety, depression. FAMILY HISTORY:  The father  of MI. The mother  of DM, heart disease. There is no family history of liver disease. SOCIAL HISTORY:  The patient is . The patient has 3 children. The patient has never used tobacco products. The patient has never consumed significant amounts of alcohol. The patient currently works full time as a CNA. PHYSICAL EXAMINATION:  /90 (BP 1 Location: Left arm, BP Patient Position: Sitting)  Pulse 85  Temp 97.7 °F (36.5 °C) (Tympanic)   Wt 142 lb 12.8 oz (64.8 kg)  LMP 2014  SpO2 98%  BMI 25.3 kg/m2  General: No acute distress. Eyes: Sclera anicteric. ENT: No oral lesions.   Thyroid normal.  Nodes: No adenopathy. Skin: No spider angiomata. No jaundice. No palmar erythema. Respiratory: Lungs clear to auscultation. Cardiovascular: Regular heart rate. No murmurs. No JVD. Abdomen: Soft non-tender. Liver size normal to percussion/palpation. Spleen not palpable. No obvious ascites. Extremities: No edema. No muscle wasting. No gross arthritic changes. Neurologic: Alert and oriented. Cranial nerves grossly intact. No asterixis. LABORATORY STUDIES:  Liver [place] of [facility] Units 4/28/2017 1/16/2017   WBC 3.6 - 11.0 K/uL  7.2   HGB 11.5 - 16.0 g/dL  14.1    - 400 K/uL  261   AST 0 - 40 IU/L 60 (H)    ALT 0 - 32 IU/L 105 (H)    Alk Phos 39 - 117 IU/L 107    Bili, Total 0.0 - 1.2 mg/dL 0.2    Bili, Direct 0.00 - 0.40 mg/dL 0.10    Albumin 3.5 - 5.5 g/dL 4.6    BUN 6 - 24 mg/dL 10 10   Creat 0.57 - 1.00 mg/dL 0.47 (L) 0.58   Na 134 - 144 mmol/L 142 144   K 3.5 - 5.2 mmol/L 4.3 3.7   Cl 96 - 106 mmol/L 102 108   CO2 18 - 29 mmol/L 24 24   Glucose 65 - 99 mg/dL 100 (H) 83     Liver [place] [address] Ref Rng & Units 1/21/2016   WBC 3.6 - 11.0 K/uL 6.3   HGB 11.5 - 16.0 g/dL 14.4    - 400 K/uL 280   AST 0 - 40 IU/L 38 (H)   ALT 0 - 32 IU/L 85 (H)   Alk Phos 39 - 117 IU/L 94   Bili, Total 0.0 - 1.2 mg/dL 0.4   Bili, Direct 0.00 - 0.40 mg/dL    Albumin 3.5 - 5.5 g/dL 4.2   BUN 6 - 24 mg/dL 12   Creat 0.57 - 1.00 mg/dL 0.58   Na 134 - 144 mmol/L 138   K 3.5 - 5.2 mmol/L 4.0   Cl 96 - 106 mmol/L 104   CO2 18 - 29 mmol/L 26   Glucose 65 - 99 mg/dL 96   Additional lab values drawn at her PCP office visit today are pending at the time of documentation.     SEROLOGIES:  Serologies Latest Ref Rng & Units 4/28/2017   Hep A Ab, Total Negative Positive (A)   Hep B Surface Ag Negative Negative   Hep B Core Ab, Total Negative Negative   Hep B Surface AB QL  Non Reactive   Hep C Ab 0.0 - 0.9 s/co ratio <0.1   Ferritin 15 - 150 ng/mL 192 (H)   Iron % Saturation 15 - 55 % 25   TAHIRA, IFA  Negative   ASMCA 0 - 19 Units 13   Ceruloplasmin 19.0 - 39.0 mg/dL 42.3 (H)   Alpha-1 antitrypsin level 90 - 200 mg/dL 182     LIVER HISTOLOGY:  1/2018. FibroScan performed at [address]. EkPa was 3.7. Suggested fibrosis level is F0-1. CAP score of 280. ENDOSCOPIC PROCEDURES:  Not available or performed    RADIOLOGY:  1/2017. Ultrasound of liver. Echogenic consistent with chronic liver disease. No liver mass lesions. No dilated bile ducts. No ascites. OTHER TESTING:  Not available or performed    ASSESSMENT AND PLAN:  Persistent elevation in liver transaminases, likely due to fatty infiltration of the liver. Liver function is normal.  The platelet count is normal.      Serologic testing to help define the cause of the laboratory abnormality were ordered at her last office visit and were negative. The most likely causes for the liver chemistry abnormalities were discussed with the patient and include fatty liver disease. This likely pre-dated the use of tamoxifen by her history, there may be some ongoing steatosis associated with use of this drug. She will be on tamoxifen for the next 3 years. Will review laboratory testing to monitor liver function and degree of liver injury when available. This was drawn in her PCP's office this morning. Patient has had a 30# weight loss and I am interested to see how her current labs correlate with this substantial change in weight. If reduced, will continue to monitor every 6 months during the time that she remains on tamoxifen. I have reviewed with the patient that there is not a significant suggestion of fibrosis on today's study and we will defer liver biopsy at this time pending review of her current labs. Repeat fibroscan could be repeated every 12-18 months during the time of the administration of tamoxifen to monitor. She is in agreement with this plan.     The patient was directed to continue all current medications at the current dosages. There are no contraindications for the patient to take any medications that are necessary for treatment of other medical issues. The patient was counseled regarding alcohol consumption. Vaccination for viral hepatitis B is recommended since the patient has no serologic evidence of previous exposure or vaccination with immunity. She has had prior administration, but as she is in home health care, would recommend a second course of HBV vaccine to be administered. Vaccination for viral hepatitis A is not needed. The patient has serologic evidence of prior exposure or vaccination with immunity. All of the above issues were discussed with the patient. All questions were answered. The patient expressed a clear understanding of the above. [address] [place] [address] in 6 months for monitoring pending review of outside lab values.     Jessica Morrison PA-C  Liver [place] [address], [address] [address]  22.  [phone]

## 2018-01-09 NOTE — PROGRESS NOTES
HISTORY OF PRESENT ILLNESS  eBnji Mena is a 46 y.o. female. HPI   Pt is here to establish care. BP is 118/79    Wt is 143 lbs today, down 30 lbs   Congratulated pt on this   Her goal wt is around 135 lbs  Pt exercises 35-40 minutes each AM  Pt has been following a low-carb diet  Discussed continued diet and w/l    Will get labs today     Pt follows with Dr. Reina Downey (hepato) for liver test abnormalities  Reviewed notes 4/28/17:    Liver function is normal.  The platelet count is normal.     Based upon laboratory studies and imaging the patient may have advanced liver disease    Serologic testing to help define the cause of the laboratory abnormality were ordered.     The most likely causes for the liver chemistry abnormalities were discussed with the patient and include fatty liver disease,    Will perform laboratory testing to monitor liver function and degree of liver injury. This included BMP, hepatic panel, CBC with platelet count, INR.   The need to perform a liver biopsy to help determine the cause and severity of the liver test abnormalities was discussed. The risks of performing the liver biopsy including pain, puncture of the lung, gallbladder, intestine or kidney and bleeding were discussed. Will defer liver biopsy for now.   The patient was directed to continue all current medications at the current dosages. There are no contraindications for the patient to take any medications that are necessary for treatment of other medical issues.   The patient was counseled regarding alcohol consumption.   The need for vaccination against viral hepatitis A and B will be assessed with serologic and instituted as appropriate.   All of the above issues were discussed with the patient. All questions were answered. The patient expressed a clear understanding of the above.   Follow-up Edmar Leonard 32 in for Fibroscan, to review all data and determine the treatment plan.   Per pt, pt has a non-alcoholic-related fatty liver  Pt will be having a fibroscan 01/09/18     Pt follows with Dr. Lily Kessler (hem/onc) for h/o breast cancer  Pt had a lumpectomy in 1/16  Pt follows with Dr. Chris Estrada (surg) for breast surgery  Reviewed notes 2/1/16: Doing well. Discussed pathology. oncotype Dx on path. Med onc:  Pt prefers Dr Lily Kessler. Rad onc Dr Davon Gomez. RTW 2/8/2016. RTC 3-4 weeks.   Pt follows with Dr. Jaylin Elizalde (plastics)   Pt had a breast reconstruction in 2/16  Continues on tamoxifen 20mg daily     Pt tapered down on prilosec OTC x 3 months   Pt then stopped this medication   However, pt has been having heart burn and reflux daily x tapering down  Advised her to try zantac OTC    Pt used to take allegra and then, zyrtec  Pt wonders which allergy medication is best  Advised using either medication prn    Pt has chronic back pain  Pt has used tramadol in the past  Pt states that she has some tabs tramadol  Discussed [facility] guidelines regarding narcotics distribution   Discussed it being OK to use this medication as she is using it    Pt c/o nausea  Pt believes that her sx are related to GERD    PMHx:  Arthritis  Hypercholesterolemia  GERD  N/V  R breast cancer  Chronic pain  Radiation therapy    FMHx:  Father - passed away from heart disease  Mother - heart disease and diabetes  Paternal aunt - breast cancer    PSHx:  Ectopic pregnancy and ovarian cystectomy  Hysterectomy  Carpal tunnel release in both wrists  R central lumpectomy and sentinel lymph node bx  R breast reconstruction with breast implant, L breast augmentation and mastopexy    SHx:   with 3 children and 2 grandchildren  Works at [employer]  Never smoker  No longer drinks alcohol (used to be rare use)    PREVENTIVE:  Colonoscopy: 2013, unsure of repeat, Dr. Lorena Em in [place]  Pap: Dr. Dimitri Carpenter, 4/28/17, complete hysterectomy   Mammogram: 10/17, negative  Dexa: not yet needed  Tdap: 01/09/18   Pneumovax: not yet needed  Zostavax: not yet needed  Shingrix: discussed  01/09/18   Flu shot: 10/17  Eye exam: Dr. Farrukh Terry, 2016, every 2 years, due  Lipids: ordered 01/09/18     Patient Active Problem List    Diagnosis Date Noted    S/P breast reconstruction 04/28/2017    Breast cancer, right ([address] 12/02/2015    Weight gain 12/17/2014    H/O total hysterectomy 06/17/2014    Hemorrhoids 01/14/2014    Arthritis      Current Outpatient Prescriptions   Medication Sig Dispense Refill    traMADol (ULTRAM) 50 mg tablet Take 50 mg by mouth every six (6) hours as needed for Pain. Indications: Pain      promethazine (PHENERGAN) 12.5 mg tablet Take 1 Tab by mouth every six (6) hours as needed for Nausea. 10 Tab 2    tamoxifen (NOLVADEX) 20 mg tablet Take 20 mg by mouth daily.  ALPRAZolam (XANAX) 0.25 mg tablet Take 1 Tab by mouth three (3) times daily as needed for Anxiety. Max Daily Amount: 0.75 mg. 25 Tab 0    diclofenac (VOLTAREN) 1 % topical gel Apply 4 g to affected area four (4) times daily as needed.  fexofenadine (ALLEGRA) 180 mg tablet Take 180 mg by mouth daily.  mometasone (NASONEX) 50 mcg/actuation nasal spray 2 Sprays by Both Nostrils route daily.  omeprazole (PRILOSEC) 20 mg capsule Take 20 mg by mouth daily.        Past Surgical History:   Procedure Laterality Date    HX BREAST LUMPECTOMY Right 1/25/2016    RIGHT CENTRAL LUMPECTOMY (TYLECTOMY) & SENTINEL LYMPH NODE BIOPSY performed by Dez Carias MD at [facility] OR    HX BREAST RECONSTRUCTION Right 2/10/2017    RIGHT BREAST RECONSTRUCTION WITH BREAST IMPLANT, LEFT BREAST AUGMENTATION AND MASTOPEXY performed by Aryan Rudolph MD at [facility] MA[facility] OR    HX BREAST REDUCTION Left 2/10/2017    BREAST MASTOPEXY performed by Aryan Rudolph MD at [facility]    ectopic preg and ovarian cystectomy    HX HYSTERECTOMY  2014    HX MASTOPEXY (BREAST LIFT) Left 02/10/2017    HX ORTHOPAEDIC Bilateral 2008    carpel tunnel release both wrists 2 weeks apart    IMPLANT BREAST SILICONE/EQ Bilateral 24/04/4221      Lab Results  Component Value Date/Time   WBC 7.2 01/16/2017 04:10 PM   HGB 14.1 01/16/2017 04:10 PM   HCT 41.5 01/16/2017 04:10 PM   PLATELET 931 59/80/6904 04:10 PM   MCV 88.1 01/16/2017 04:10 PM     No results found for: CHOL, CHOLPOCT, HDL, LDL, LDLC, LDLCPOC, LDLCEXT, TRIGL, TGLPOCT, CHHD, CHHDX  Lab Results  Component Value Date/Time   GFR est non- 04/28/2017 01:32 PM   GFR est  04/28/2017 01:32 PM   Creatinine 0.47 04/28/2017 01:32 PM   BUN 10 04/28/2017 01:32 PM   Sodium 142 04/28/2017 01:32 PM   Potassium 4.3 04/28/2017 01:32 PM   Chloride 102 04/28/2017 01:32 PM   CO2 24 04/28/2017 01:32 PM        Review of Systems   Constitutional: Negative for chills and fever. HENT: Negative for hearing loss and tinnitus. Eyes: Negative for blurred vision and double vision. Respiratory: Negative for shortness of breath and wheezing. Cardiovascular: Negative for chest pain and palpitations. Gastrointestinal: Positive for nausea (gerd related). Negative for vomiting. Genitourinary: Negative for dysuria and frequency. Musculoskeletal: Positive for back pain (mild). Negative for falls. Skin: Negative for itching and rash. Neurological: Negative for dizziness, loss of consciousness and headaches. Psychiatric/Behavioral: Negative for depression. The patient is not nervous/anxious. Physical Exam   Constitutional: She is oriented to person, place, and time. She appears well-developed and well-nourished. No distress. HENT:   Head: Normocephalic and atraumatic. Right Ear: External ear normal.   Left Ear: External ear normal.   Mouth/Throat: Oropharynx is clear and moist. No oropharyngeal exudate. Eyes: Conjunctivae and EOM are normal. Pupils are equal, round, and reactive to light. Right eye exhibits no discharge. Left eye exhibits no discharge. No scleral icterus. Neck: Normal range of motion. Neck supple.    No carotid bruits    Cardiovascular: Normal rate, regular rhythm, normal heart sounds and intact distal pulses. Exam reveals no gallop and no friction rub. No murmur heard. Pulmonary/Chest: Effort normal and breath sounds normal. No respiratory distress. She has no wheezes. She has no rales. She exhibits no tenderness. Abdominal: Soft. She exhibits no distension and no mass. There is no tenderness. There is no rebound and no guarding. Musculoskeletal: Normal range of motion. She exhibits no edema, tenderness or deformity. Lymphadenopathy:     She has no cervical adenopathy. Neurological: She is alert and oriented to person, place, and time. Coordination normal.   Skin: Skin is warm and dry. No rash noted. She is not diaphoretic. No erythema. No pallor. Psychiatric: She has a normal mood and affect. Her behavior is normal.       ASSESSMENT and PLAN    ICD-10-CM ICD-9-CM    1. Physical exam, annual    Discussed diet and weight loss, pt is doing a fantastic job with a low carb diet, will continue, she has lost 30 lbs and is near her ideal wt, she exercises routinely as well and will continue to do this, Tdap given today, flu shot UTD, eye exam due, mammo UTD, pelvic exam UTD, labs all ordered, discussed shingrix with her   Z00.00 V70.0 VZV AB, IGG      METABOLIC PANEL, COMPREHENSIVE      LIPID PANEL      CBC W/O DIFF      TSH 3RD GENERATION      VITAMIN D, 25 HYDROXY      HEMOGLOBIN A1C WITH EAG      URINALYSIS W/ RFLX MICROSCOPIC   2. Malignant neoplasm of right breast in female, estrogen receptor positive, unspecified site of breast ([facility] [address]    On tamoxifen, follows with Dr. Andrei Llanos 174.9     Z17.0 V86.0    3. Gastroesophageal reflux disease without esophagitis    Discussed zantac 1-2x per day   K21.9 530.81    4. Overweight (BMI 25.0-29. 9)    See above   E66.3 278.02    5. Prediabetes    Check a1c, should've improved with her substantial w/l   R73.03 790.29    6.  Pure hypercholesterolemia    Diet-controled, check lipids   E78.00 272.0         Depression screen reviewed and negative. Scribed by Sue Huynh of [facility], as dictated by Dr. Steven Valle. Current diagnosis and concerns discussed with pt at length. Pt understands risks and benefits or current treatment plan and medications, and accepts the treatment and medication with any possible risks. Pt asks appropriate questions, which were answered. Pt was instructed to call with any concerns or problems. This note will not be viewable in [address]

## 2018-01-09 NOTE — MR AVS SNAPSHOT
Visit Information Date & Time Provider Department Dept. Phone Encounter #  
 1/9/2018  9:30 AM Sarah Verma, [address]  Follow-up Instructions Return in about 1 year (around 1/9/2019). Your Appointments 5/4/2018  9:00 AM  
ROUTINE CARE with Joey Awad MD  
[facility] Appt Note: Well Woman $0CP SL 4.28.17  
 [address] [facility] [id number] [address] [place] [address] [address] [address] Upcoming Health Maintenance Date Due Pneumococcal 19-64 Highest Risk (1 of 3 - PCV13) 3/8/1984 DTaP/Tdap/Td series (1 - Tdap) 3/8/1986 PAP AKA CERVICAL CYTOLOGY 12/2/2018 BREAST CANCER SCRN MAMMOGRAM 10/31/2019 COLONOSCOPY 1/1/2023 Allergies as of 1/9/2018  Review Complete On: 1/9/2018 By: Sarah Verma MD  
 No Known Allergies Current Immunizations  Reviewed on 1/9/2018 Name Date Influenza Vaccine 10/15/2017 Reviewed by Sarah Verma MD on 1/9/2018 at  9:32 AM  
 Reviewed by Sarah Verma MD on 1/9/2018 at  9:36 AM  
You Were Diagnosed With   
  
 Codes Comments Physical exam, annual    -  Primary ICD-10-CM: Z00.00 ICD-9-CM: V70.0 Malignant neoplasm of right breast in female, estrogen receptor positive, unspecified site of breast ([facility]     ICD-10-CM: C50.911, Z17.0 ICD-9-CM: 174.9, V86.0 Gastroesophageal reflux disease without esophagitis     ICD-10-CM: K21.9 ICD-9-CM: 530.81 Overweight (BMI 25.0-29. 9)     ICD-10-CM: Y82.3 ICD-9-CM: 278.02 Prediabetes     ICD-10-CM: R73.03 
ICD-9-CM: 790.29 Pure hypercholesterolemia     ICD-10-CM: E78.00 ICD-9-CM: 272.0 Vitals BP Pulse Temp Resp Height(growth percentile) Weight(growth percentile) 118/79 (BP 1 Location: Left arm, BP Patient Position: Sitting) 82 97.8 °F (36.6 °C) (Oral) 16 5' 3\" (1.6 m) 143 lb (64.9 kg) LMP SpO2 BMI OB Status Smoking Status 04/11/2014 98% 25.33 kg/m2 Hysterectomy Never Smoker Vitals History BMI and BSA Data Body Mass Index Body Surface Area  
 25.33 kg/m 2 1.7 m 2 Preferred Pharmacy Pharmacy Name Phone [id number] [phone] [facility] [phone] Your Updated Medication List  
  
   
This list is accurate as of: 1/9/18  9:58 AM.  Always use your most recent med list.  
  
  
  
  
 raNITIdine 150 mg tablet Commonly known as:  ZANTAC Take 1 Tab by mouth two (2) times a day. tamoxifen 20 mg tablet Commonly known as:  NOLVADEX Take 20 mg by mouth daily. traMADol 50 mg tablet Commonly known as:  ULTRAM  
Take 50 mg by mouth every six (6) hours as needed for Pain. Indications: Pain VOLTAREN 1 % Gel Generic drug:  diclofenac Apply 4 g to affected area four (4) times daily as needed. Prescriptions Sent to Pharmacy Refills  
 raNITIdine (ZANTAC) 150 mg tablet 6 Sig: Take 1 Tab by mouth two (2) times a day. Class: Normal  
 Pharmacy: [address] #: [phone] Route: Oral  
  
We Performed the Following CBC W/O DIFF [62898 CPT(R)] HEMOGLOBIN A1C WITH EAG [53237 CPT(R)] LIPID PANEL [13707 CPT(R)] METABOLIC PANEL, COMPREHENSIVE [15901 CPT(R)] TSH 3RD GENERATION [73470 CPT(R)] URINALYSIS W/ RFLX MICROSCOPIC [11834 CPT(R)] VITAMIN D, 25 HYDROXY [id number] CPT(R)] VZV AB, IGG [id number] CPT(R)] Follow-up Instructions Return in about 1 year (around 1/9/2019). To-Do List   
 01/09/2018 11:00 AM  
  Appointment with Gregery Cranker, [place] at [address] ([phone]) Patient Instructions Start zantac 1-2 times per day for hearburn Introducing [facility]!    
 Dear Danielle Wills: 
 Thank you for requesting a [employer] account. Our records indicate that you already have an active [employer] account. You can access your account anytime at [email or web address] Did you know that you can access your hospital and ER discharge instructions at any time in [employer]? You can also review all of your test results from your hospital stay or ER visit. Additional Information If you have questions, please visit the Frequently Asked Questions section of the [employer] website at [email or web address] Remember, [employer] is NOT to be used for urgent needs. For medical emergencies, dial 911. Now available from your iPhone and Android! Please provide this summary of care documentation to your next provider. Your primary care clinician is listed as Christine Du. If you have any questions after today's visit, please call [phone].

## 2018-01-10 LAB
25(OH)D3+25(OH)D2 SERPL-MCNC: 30.1 NG/ML (ref 30–100)
ALBUMIN SERPL-MCNC: 4.9 G/DL (ref 3.5–5.5)
ALBUMIN/GLOB SERPL: 1.6 {RATIO} (ref 1.2–2.2)
ALP SERPL-CCNC: 70 IU/L (ref 39–117)
ALT SERPL-CCNC: 20 IU/L (ref 0–32)
APPEARANCE UR: CLEAR
AST SERPL-CCNC: 22 IU/L (ref 0–40)
BILIRUB SERPL-MCNC: 0.5 MG/DL (ref 0–1.2)
BILIRUB UR QL STRIP: NEGATIVE
BUN SERPL-MCNC: 11 MG/DL (ref 6–24)
BUN/CREAT SERPL: 19 (ref 9–23)
CALCIUM SERPL-MCNC: 9.5 MG/DL (ref 8.7–10.2)
CHLORIDE SERPL-SCNC: 101 MMOL/L (ref 96–106)
CHOLEST SERPL-MCNC: 193 MG/DL (ref 100–199)
CO2 SERPL-SCNC: 23 MMOL/L (ref 18–29)
COLOR UR: YELLOW
CREAT SERPL-MCNC: 0.58 MG/DL (ref 0.57–1)
ERYTHROCYTE [DISTWIDTH] IN BLOOD BY AUTOMATED COUNT: 13.1 % (ref 12.3–15.4)
EST. AVERAGE GLUCOSE BLD GHB EST-MCNC: 117 MG/DL
GLOBULIN SER CALC-MCNC: 3 G/DL (ref 1.5–4.5)
GLUCOSE SERPL-MCNC: 93 MG/DL (ref 65–99)
GLUCOSE UR QL: NEGATIVE
HBA1C MFR BLD: 5.7 % (ref 4.8–5.6)
HCT VFR BLD AUTO: 44.2 % (ref 34–46.6)
HDLC SERPL-MCNC: 77 MG/DL
HGB BLD-MCNC: 14.6 G/DL (ref 11.1–15.9)
HGB UR QL STRIP: NEGATIVE
KETONES UR QL STRIP: NEGATIVE
LDLC SERPL CALC-MCNC: 91 MG/DL (ref 0–99)
LEUKOCYTE ESTERASE UR QL STRIP: NEGATIVE
MCH RBC QN AUTO: 29.9 PG (ref 26.6–33)
MCHC RBC AUTO-ENTMCNC: 33 G/DL (ref 31.5–35.7)
MCV RBC AUTO: 91 FL (ref 79–97)
MICRO URNS: NORMAL
NITRITE UR QL STRIP: NEGATIVE
PH UR STRIP: 7.5 [PH] (ref 5–7.5)
PLATELET # BLD AUTO: 286 X10E3/UL (ref 150–379)
POTASSIUM SERPL-SCNC: 4.6 MMOL/L (ref 3.5–5.2)
PROT SERPL-MCNC: 7.9 G/DL (ref 6–8.5)
PROT UR QL STRIP: NEGATIVE
RBC # BLD AUTO: 4.88 X10E6/UL (ref 3.77–5.28)
SODIUM SERPL-SCNC: 143 MMOL/L (ref 134–144)
SP GR UR: 1.01 (ref 1–1.03)
TRIGL SERPL-MCNC: 125 MG/DL (ref 0–149)
TSH SERPL DL<=0.005 MIU/L-ACNC: 3.9 UIU/ML (ref 0.45–4.5)
UROBILINOGEN UR STRIP-MCNC: 0.2 MG/DL (ref 0.2–1)
VLDLC SERPL CALC-MCNC: 25 MG/DL (ref 5–40)
VZV IGG SER IA-ACNC: 1779 INDEX
WBC # BLD AUTO: 4.1 X10E3/UL (ref 3.4–10.8)

## 2018-04-06 ENCOUNTER — TELEPHONE (OUTPATIENT)
Dept: INTERNAL MEDICINE CLINIC | Age: 53
End: 2018-04-06

## 2018-04-06 NOTE — TELEPHONE ENCOUNTER
#[phone] pt states she has had a cold for a bit now and the color of mucus is changing and she is feeling worse. 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 Pt states cold symptoms are getting worse and she had to call out of work today. Pt states she needs to be seen today.

## 2018-04-06 NOTE — TELEPHONE ENCOUNTER
Called, spoke to pt. Two pt identifiers confirmed. Pt informed that no current openings. Pt advised for UC for sx. Pt verbalized understanding of information discussed w/ no further questions at this time.

## 2018-07-13 ENCOUNTER — OFFICE VISIT (OUTPATIENT)
Dept: HEMATOLOGY | Age: 53
End: 2018-07-13

## 2018-07-13 VITALS
DIASTOLIC BLOOD PRESSURE: 89 MMHG | TEMPERATURE: 98 F | OXYGEN SATURATION: 98 % | SYSTOLIC BLOOD PRESSURE: 132 MMHG | HEART RATE: 72 BPM | BODY MASS INDEX: 26.93 KG/M2 | WEIGHT: 152 LBS | HEIGHT: 63 IN

## 2018-07-13 DIAGNOSIS — R74.8 ELEVATED LIVER ENZYMES: Primary | ICD-10-CM

## 2018-07-13 RX ORDER — GLUCOSAMINE SULFATE 1500 MG
1000 POWDER IN PACKET (EA) ORAL DAILY
COMMUNITY

## 2018-07-13 NOTE — PROGRESS NOTES
93 Brett Olvera MD, NUHA Muller PA-C Howard Manual, MD, MD Sandra Heard NP Millicent Bussing, NP        [address] [facility], [address] Tana Boland  22.     [phone]     FAX: 411 [address], [address] [place] [address], 300 [facility] [address]     [phone]     FAX: [phone]       Patient Care Team:  Jolena Ganser, MD as PCP - General (Internal Medicine)  Kim Spurling, MD as Physician (Gynecology)  Beni Cole MD as Surgeon (General Surgery)  Meli Holland MD as Physician (Radiation Oncology)  Orin Olivares MD as Physician (Hematology and Oncology)  Corrinne Gut, NP as Nurse Practitioner (Oncology)  Sebastian Grant MD as Surgeon (Plastic Surgery)  Jorge Zamudio MD (Hepatology)      Problem List  Date Reviewed: 1/9/2018          Codes Class Noted    Prediabetes ICD-10-CM: R73.03  ICD-9-CM: 790.29  1/9/2018        Elevated liver enzymes ICD-10-CM: R74.8  ICD-9-CM: 790.5  1/9/2018        S/P breast reconstruction ICD-10-CM: M12.50  ICD-9-CM: V43.82  4/28/2017        Breast cancer, right ([facility] ICD-10-CM: C50.911  ICD-9-CM: 174.9  12/2/2015    Overview Signed 4/28/2017  1:02 PM by Jorge Zamudio MD     Lumpectomy, XRT 2016             Weight gain ICD-10-CM: R63.5  ICD-9-CM: 783.1  12/17/2014        H/O total hysterectomy ICD-10-CM: Z90.710  ICD-9-CM: V88.01  6/17/2014        Arthritis ICD-10-CM: M19.90  ICD-9-CM: 716.90  Unknown    Overview Signed 1/14/2014  3:04 PM by Kim Spurling, MD     lower back             Hemorrhoids ICD-10-CM: S99.7  ICD-9-CM: 455.6  1/14/2014              Rudy Flores returns to the [address] for management of elevated liver enzymes.   The active problem list, all pertinent past medical history, medications, radiologic findings and laboratory findings related to the liver disorder were reviewed with the patient. The patient is a 48 y.o.  female who was first noted to have abnormalities in liver enzymes in 1/2016 at the time of her breast cancer diagnosis. Patient has 1/2016 breast cancer surgery and has been on tamoxifen since 4/2016. Target of 5 years goal.  She is having hotflashes related to this medication. Serologic evaluation for markers of chronic liver disease were assessed at her initial office visit and are normal/negative. Ultrasound of the liver was performed in 1/2017. The results of the imaging suggested chronic liver disease, ?steatosis. An assessment of liver fibrosis with elastography has been performed. This showed no fibrosis and a CAP score over 250, consistent with steatosis. The patient had not started any new medications within 3 months preceeding the elevation in liver chemistries. She was subsequently started on tamoxifen in 4/2017 after the initial finding of elevated enzymes. She has otherwise tolerated this medication well and is planned to continue with administration for the next 3 years. The most recent laboratory studies indicate that the liver transaminases have normalized with weight losses, ALP is normal, tests of hepatic synthetic and metabolic function are normal, and the platelet count is normal.    The patient has no symptoms which could be attributed to the liver disorder. The patient completes all daily activities without any functional limitations. The patient has not experienced fatigue, pain in the right side over the liver. Of note, since her initial presentation to this office, she has had an intentional weight loss of 30# in 8 months, over the last 6 months she has regained ~10# as she has not followed her Mila's diet as strictly. Her goal target weight is 140.     She has discontinued the use of all alcohol and acetaminophen products since 2017. ALLERGIES  No Known Allergies    MEDICATIONS  Current Outpatient Prescriptions   Medication Sig    cholecalciferol (VITAMIN D3) 1,000 unit cap Take  by mouth daily.  raNITIdine (ZANTAC) 150 mg tablet TAKE 1 TAB BY MOUTH TWO (2) TIMES A DAY.  traMADol (ULTRAM) 50 mg tablet Take 50 mg by mouth every six (6) hours as needed for Pain. Indications: Pain    tamoxifen (NOLVADEX) 20 mg tablet Take 20 mg by mouth daily.  diclofenac (VOLTAREN) 1 % topical gel Apply 4 g to affected area four (4) times daily as needed. No current facility-administered medications for this visit. SYSTEM REVIEW NOT RELATED TO LIVER DISEASE OR REVIEWED ABOVE:  Constitution systems: Negative for fever, chills. Weight loss of 30# over the past year, regain of 10# in the past 6 months. Eyes: Negative for visual changes. ENT: Negative for sore throat, painful swallowing. Respiratory: Negative for cough, hemoptysis, SOB. Cardiology: Negative for chest pain, palpitations. GI:  Negative for constipation or diarrhea. : Negative for urinary frequency, dysuria, hematuria, nocturia. Skin: Negative for rash. Hematology: Negative for easy bruising, blood clots. Musculo-skeletal: Negative for back pain, muscle pain, weakness. Neurologic: Negative for headaches, dizziness, vertigo, memory problems not related to HE. Psychology: Negative for anxiety, depression. FAMILY HISTORY:  The father  of MI. The mother  of DM, heart disease. There is no family history of liver disease. SOCIAL HISTORY:  The patient is . The patient has 3 children. The patient has never used tobacco products. The patient has never consumed significant amounts of alcohol. The patient currently works full time as a CNA.       PHYSICAL EXAMINATION:  /89 (BP 1 Location: Left arm, BP Patient Position: Sitting)  Pulse 72  Temp 98 °F (36.7 °C) (Tympanic)   Ht 5' 3\" (1.6 m)  Wt 152 lb (68.9 kg)  LMP 04/11/2014  SpO2 98%  BMI 26.93 kg/m2  General: No acute distress. Eyes: Sclera anicteric. ENT: No oral lesions. Thyroid normal.  Nodes: No adenopathy. Skin: No spider angiomata. No jaundice. No palmar erythema. Respiratory: Lungs clear to auscultation. Cardiovascular: Regular heart rate. No murmurs. No JVD. Abdomen: Soft non-tender. Liver size normal to percussion/palpation. Spleen not palpable. No obvious ascites. Extremities: No edema. No muscle wasting. No gross arthritic changes. Neurologic: Alert and oriented. Cranial nerves grossly intact. No asterixis. LABORATORY STUDIES:  From 5/2018  AST/ALT/ALP/T Bili/ALB: 23/19/78/0.3/4.6  WBC/HB/PLT/INR:4.6/14.2/254  BUN/CREAT:11/0.60    Liver [place] of [address] Units 1/9/2018 4/28/2017 1/16/2017   WBC 3.4 - 10.8 x10E3/uL 4.1  7.2   HGB 11.1 - 15.9 g/dL 14.6  14.1    - 379 x10E3/uL 286  261   AST 0 - 40 IU/L 22 60 (H)    ALT 0 - 32 IU/L 20 105 (H)    Alk Phos 39 - 117 IU/L 70 107    Bili, Total 0.0 - 1.2 mg/dL 0.5 0.2    Bili, Direct 0.00 - 0.40 mg/dL  0.10    Albumin 3.5 - 5.5 g/dL 4.9 4.6    BUN 6 - 24 mg/dL 11 10 10   Creat 0.57 - 1.00 mg/dL 0.58 0.47 (L) 0.58   Na 134 - 144 mmol/L 143 142 144   K 3.5 - 5.2 mmol/L 4.6 4.3 3.7   Cl 96 - 106 mmol/L 101 102 108   CO2 18 - 29 mmol/L 23 24 24   Glucose 65 - 99 mg/dL 93 100 (H) 83   Additional lab values drawn at her PCP office visit today are pending at the time of documentation. SEROLOGIES:  Serologies Latest Ref Rng & Units 4/28/2017   Hep A Ab, Total Negative Positive (A)   Hep B Surface Ag Negative Negative   Hep B Core Ab, Total Negative Negative   Hep B Surface AB QL  Non Reactive   Hep C Ab 0.0 - 0.9 s/co ratio <0.1   Ferritin 15 - 150 ng/mL 192 (H)   Iron % Saturation 15 - 55 % 25   TAHIRA, IFA  Negative   ASMCA 0 - 19 Units 13   Ceruloplasmin 19.0 - 39.0 mg/dL 42.3 (H)   Alpha-1 antitrypsin level 90 - 200 mg/dL 182     LIVER HISTOLOGY:  1/2018.   FibroScan performed at [address]. EkPa was 3.7. Suggested fibrosis level is F0-1. CAP score of 280. ENDOSCOPIC PROCEDURES:  Not available or performed    RADIOLOGY:  1/2017. Ultrasound of liver. Echogenic consistent with chronic liver disease. No liver mass lesions. No dilated bile ducts. No ascites. OTHER TESTING:  Not available or performed    ASSESSMENT AND PLAN:  Persistent elevation in liver transaminases, likely due to fatty infiltration of the liver. Liver function is normal.  The platelet count is normal.      Serologic testing to help define the cause of the laboratory abnormality were ordered at her last office visit and were negative. The most likely causes for the liver chemistry abnormalities were discussed with the patient and include fatty liver disease. This likely pre-dated the use of tamoxifen by her history, there may be some ongoing steatosis associated with use of this drug. She will be on tamoxifen for the next 3 years. l have reviewed laboratory testing done recently through her oncology office and am pleased to see that she has continued to have normal liver enzymes with her weight reduction. She will continue these efforts and we will plan to repeat her evaluation in 12 months with repeat fibroscan at that time. The patient will notify me if her routine labs show any changes in her study enzymes prior to this planned return. The patient was directed to continue all current medications at the current dosages. There are no contraindications for the patient to take any medications that are necessary for treatment of other medical issues. The patient was counseled regarding alcohol consumption. Vaccination for viral hepatitis B is recommended since the patient has no serologic evidence of previous exposure or vaccination with immunity.   She has had prior administration, but as she is in home health care, would recommend a second course of HBV vaccine to be administered. Vaccination for viral hepatitis A is not needed. The patient has serologic evidence of prior exposure or vaccination with immunity. All of the above issues were discussed with the patient. All questions were answered. The patient expressed a clear understanding of the above. [address] [address] in 12 months with repeat fibroscan at that time.      Virginia Dawn PA-C  [facility]iver [place] of [address] [place] [address], [address] Tana Bol[address]  22.  [phone]

## 2018-07-13 NOTE — PROGRESS NOTES
Chief Complaint   Patient presents with    Follow-up     Visit Vitals    /89 (BP 1 Location: Left arm, BP Patient Position: Sitting)    Pulse 72    Temp 98 °F (36.7 °C) (Tympanic)    Ht 5' 3\" (1.6 m)    Wt 152 lb (68.9 kg)    SpO2 98%    BMI 26.93 kg/m2     PHQ over the last two weeks 7/13/2018   Little interest or pleasure in doing things Not at all   Feeling down, depressed or hopeless Not at all   Total Score PHQ 2 0     1. Have you been to the ER, urgent care clinic since your last visit? Hospitalized since your last visit? Yes - Urgent care - date unknown. X-ray of toe    2. Have you seen or consulted any other health care providers outside of the [address] [place] [address] since your last visit? Include any pap smears or colon screening.  No

## 2018-07-13 NOTE — MR AVS SNAPSHOT
[address] [facility] [phone] [address] 
[phone] Patient: Dave Perry MRN: [id number] :1965 Visit Information Date & Time Provider Department Dept. Phone Encounter #  
 2018  8:00 AM Debbie Manzo, [address] [place] [address] [id number] Follow-up Instructions Return in about 1 year (around 2019) for [address] and  [address] Your Appointments 2019 10:30 AM  
PHYSICAL with Medhat Alejo, [address] [place] [address],4Th Floor [facility]) Appt Note: 1 year cpe  
 [address] [address]  
[address] [facility] [address] [place] [address] Upcoming Health Maintenance Date Due Pneumococcal 19-64 Highest Risk (1 of 3 - PCV13) 3/8/1984 Influenza Age 5 to Adult 2018 PAP AKA CERVICAL CYTOLOGY 2018 BREAST CANCER SCRN MAMMOGRAM 10/31/2019 COLONOSCOPY 2023 DTaP/Tdap/Td series (2 - Td) 2028 Allergies as of 2018  Review Complete On: 2018 By: Lynn Javed LPN No Known Allergies Current Immunizations  Reviewed on 2018 Name Date Influenza Vaccine 10/15/2017 Tdap 2018 Not reviewed this visit Vitals BP Pulse Temp Height(growth percentile) Weight(growth percentile) LMP  
 132/89 (BP 1 Location: Left arm, BP Patient Position: Sitting) 72 98 °F (36.7 °C) (Tympanic) 5' 3\" (1.6 m) 152 lb (68.9 kg) 2014 SpO2 BMI OB Status Smoking Status 98% 26.93 kg/m2 Hysterectomy Never Smoker BMI and BSA Data Body Mass Index Body Surface Area  
 26.93 kg/m 2 1.75 m 2 Preferred Pharmacy Pharmacy Name Phone [id number] [phone] Your Updated Medication List  
  
   
This list is accurate as of 18  8:38 AM.  Always use your most recent med list.  
  
  
  
  
 raNITIdine 150 mg tablet Commonly known as:  ZANTAC TAKE 1 TAB BY MOUTH TWO (2) TIMES A DAY. tamoxifen 20 mg tablet Commonly known as:  NOLVADEX Take 20 mg by mouth daily. traMADol 50 mg tablet Commonly known as:  ULTRAM  
Take 50 mg by mouth every six (6) hours as needed for Pain. Indications: Pain VITAMIN D3 1,000 unit Cap Generic drug:  cholecalciferol Take  by mouth daily. VOLTAREN 1 % Gel Generic drug:  diclofenac Apply 4 g to affected area four (4) times daily as needed. Follow-up Instructions Return in about 1 year (around 7/13/2019) for Bennie Remy and [address] To-Do List   
 11/02/2018 8:00 AM  
  Appointment with [address] at [address] [place] [address] ([phone]) Shower or bathe using soap and water. Do not use deodorant, powder, perfumes, or lotion the day of your exam.  If your prior mammograms were not performed at [facility] 6 please bring films with you or forward prior images 2 days before your procedure. If patient is not a callback diagnostic, the patient must have an order/script from the physician for the diagnostic. Please bring it on the day of the mammogram or have it faxed to the department. [facility] [place]  [phone] [facility] [address] [place]  [phone] [facility] [phone] [facility] [address] [facility] [phone] [facility] [phone] [facility] SERVICES! Dear Neal King: Thank you for requesting a Analytics Quotient account. Our records indicate that you already have an active Analytics Quotient account. You can access your account anytime at [email or web address] Did you know that you can access your hospital and ER discharge instructions at any time in Analytics Quotient? You can also review all of your test results from your hospital stay or ER visit. Additional Information If you have questions, please visit the Frequently Asked Questions section of the PageLever website at [email or web address] Remember, PageLever is NOT to be used for urgent needs. For medical emergencies, dial 911. Now available from your iPhone and Android! Please provide this summary of care documentation to your next provider. Your primary care clinician is listed as Yadira Loera. If you have any questions after today's visit, please call [phone].

## 2018-08-23 ENCOUNTER — OFFICE VISIT (OUTPATIENT)
Dept: INTERNAL MEDICINE CLINIC | Age: 53
End: 2018-08-23

## 2018-08-23 VITALS
RESPIRATION RATE: 16 BRPM | TEMPERATURE: 98.1 F | DIASTOLIC BLOOD PRESSURE: 82 MMHG | OXYGEN SATURATION: 96 % | BODY MASS INDEX: 27.64 KG/M2 | WEIGHT: 156 LBS | SYSTOLIC BLOOD PRESSURE: 128 MMHG | HEART RATE: 76 BPM | HEIGHT: 63 IN

## 2018-08-23 DIAGNOSIS — R73.01 IFG (IMPAIRED FASTING GLUCOSE): ICD-10-CM

## 2018-08-23 DIAGNOSIS — E66.3 OVERWEIGHT: ICD-10-CM

## 2018-08-23 DIAGNOSIS — M54.50 CHRONIC BILATERAL LOW BACK PAIN WITHOUT SCIATICA: ICD-10-CM

## 2018-08-23 DIAGNOSIS — R74.8 ELEVATED LIVER ENZYMES: Primary | ICD-10-CM

## 2018-08-23 DIAGNOSIS — K21.9 GASTROESOPHAGEAL REFLUX DISEASE WITHOUT ESOPHAGITIS: ICD-10-CM

## 2018-08-23 DIAGNOSIS — G89.29 CHRONIC BILATERAL LOW BACK PAIN WITHOUT SCIATICA: ICD-10-CM

## 2018-08-23 DIAGNOSIS — N64.4 BREAST PAIN, RIGHT: ICD-10-CM

## 2018-08-23 NOTE — MR AVS SNAPSHOT
[address] [facility] 
[phone] Patient: Brooklyn Jennings MRN: [id number] :1965 Visit Information Date & Time Provider Department Dept. Phone Encounter #  
 2018  8:00 AM Dequan SawyerIvania [facility] [phone] [id number] Follow-up Instructions Return in about 5 months (around 2019) for as scheduled. Your Appointments 2019 10:30 AM  
PHYSICAL with Dequan Sawyer, [facility] Appt Note: 1 year cpe  
 [address] [address]  
[address] [place] [address] [address] [facility] Upcoming Health Maintenance Date Due Pneumococcal 19-64 Highest Risk (1 of 3 - PCV13) 3/8/1984 Influenza Age 5 to Adult 2018 PAP AKA CERVICAL CYTOLOGY 2018 BREAST CANCER SCRN MAMMOGRAM 10/31/2019 COLONOSCOPY 2023 DTaP/Tdap/Td series (2 - Td) 2028 Allergies as of 2018  Review Complete On: 2018 By: MARI Nash No Known Allergies Current Immunizations  Reviewed on 2018 Name Date Influenza Vaccine 10/15/2017 Tdap 2018 Not reviewed this visit You Were Diagnosed With   
  
 Codes Comments Elevated liver enzymes    -  Primary ICD-10-CM: R74.8 ICD-9-CM: 790.5 IFG (impaired fasting glucose)     ICD-10-CM: R73.01 
ICD-9-CM: 790.21 Chronic bilateral low back pain without sciatica     ICD-10-CM: M54.5, G89.29 ICD-9-CM: 724.2, 338.29 Breast pain, right     ICD-10-CM: N64.4 ICD-9-CM: 611.71 Overweight     ICD-10-CM: V24.8 ICD-9-CM: 278.02 Gastroesophageal reflux disease without esophagitis     ICD-10-CM: K21.9 ICD-9-CM: 530.81 Vitals BP Pulse Temp Resp Height(growth percentile) Weight(growth percentile) 128/82 (BP 1 Location: Left arm, BP Patient Position: Sitting) 76 98.1 °F (36.7 °C) (Oral) 16 5' 3\" (1.6 m) 156 lb (70.8 kg) LMP SpO2 BMI OB Status Smoking Status 04/11/2014 96% 27.63 kg/m2 Hysterectomy Never Smoker Vitals History BMI and BSA Data Body Mass Index Body Surface Area  
 27.63 kg/m 2 1.77 m 2 Preferred Pharmacy Pharmacy Name Phone [phone] [facility] [phone] Your Updated Medication List  
  
   
This list is accurate as of 8/23/18  8:05 AM.  Always use your most recent med list.  
  
  
  
  
 raNITIdine 150 mg tablet Commonly known as:  ZANTAC TAKE 1 TAB BY MOUTH TWO (2) TIMES A DAY. tamoxifen 20 mg tablet Commonly known as:  NOLVADEX Take 20 mg by mouth daily. traMADol 50 mg tablet Commonly known as:  ULTRAM  
Take 50 mg by mouth every six (6) hours as needed for Pain. Indications: Pain VITAMIN D3 1,000 unit Cap Generic drug:  cholecalciferol Take  by mouth daily. VOLTAREN 1 % Gel Generic drug:  diclofenac Apply 4 g to affected area four (4) times daily as needed. We Performed the Following HEMOGLOBIN A1C WITH EAG [99336 CPT(R)] METABOLIC PANEL, COMPREHENSIVE [98222 CPT(R)] Follow-up Instructions Return in about 5 months (around 1/23/2019) for as scheduled. To-Do List   
 08/23/2018 Imaging:  [facility] MAMMO RT DX INCL CAD   
  
 08/23/2018 Imaging:   BREAST AXILLA RT   
  
 11/02/2018 8:00 AM  
  Appointment with [facility] 2 at [address] [place] [address] ([phone]) Shower or bathe using soap and water. Do not use deodorant, powder, perfumes, or lotion the day of your exam.  If your prior mammograms were not performed at [facility] 6 please bring films with you or forward prior images 2 days before your procedure.  Not doing so may result in your appointment needing to be rescheduled. If patient is not a callback diagnostic, the patient must have an order/script from the physician for the diagnostic. Please bring it on the day of the mammogram or have it faxed to the department. [facility]  [phone] [facility] [address] [place]  [phone] [phone] [facility] [address] [facility] [phone] [facility] [phone] [facility]! Dear Mima Alvarez: Thank you for requesting a Domino Magazine account. Our records indicate that you already have an active Domino Magazine account. You can access your account anytime at [email or web address] Did you know that you can access your hospital and ER discharge instructions at any time in Domino Magazine? You can also review all of your test results from your hospital stay or ER visit. Additional Information If you have questions, please visit the Frequently Asked Questions section of the Domino Magazine website at [email or web address] Remember, Domino Magazine is NOT to be used for urgent needs. For medical emergencies, dial 911. Now available from your iPhone and Android! Please provide this summary of care documentation to your next provider. Your primary care clinician is listed as Wilner Jonas. If you have any questions after today's visit, please call [phone].

## 2018-08-23 NOTE — PROGRESS NOTES
HISTORY OF PRESENT ILLNESS  Christo Sarkar is a 48 y.o. female. HPI  Last here 1/9/18. Pt is here for acute/routine care. Pt c/o sharp R breast pain x 2 weeks  Pt states she had breast cancer on that side, although she did not experience pain with that  Sx do not occur every day, just periodically (about 3 times so far)  She describes it as a shooting pain that goes straight through to her back  Pt has had a partial mastectomy on that side, and has an implant  Ordered US    Pt follows with Dr. Jeanie Calix (hem/onc) for h/o breast cancer  Pt had a lumpectomy in 1/16  Reviewed notes 5/18: regarding stage 1 breast cancer, every thing looked good, come back 6 months  Pt also sees Dr Jim Witt (rad onc)  Pt follows with Dr. Stephani Chandler (plastics)   Pt had a breast reconstruction in 2/16  Continues on tamoxifen 20mg daily      Wt is up 14 lbs x lov   Pt states she previously lost too much too fast  Pt went off Mila's diet, says it was too unrealistic  Discussed decreasing carbs  Discussed continued diet and w/l     Reviewed labs 1/18     Pt follows with Dr. Tre Lares (hepato) for liver test abnormalities  Reviewed notes 7/13/18:    Persistent elevation in liver transaminases, likely due to fatty infiltration of the liver. Liver function is normal.  The platelet count is normal.    Serologic testing to help define the cause of the laboratory abnormality were ordered at her last office visit and were negative. The most likely causes for the liver chemistry abnormalities were discussed with the patient and include fatty liver disease. This likely pre-dated the use of tamoxifen by her history, there may be some ongoing steatosis associated with use of this drug. She will be on tamoxifen for the next 3 years. l have reviewed laboratory testing done recently through her oncology office and am pleased to see that she has continued to have normal liver enzymes with her weight reduction.   She will continue these efforts and we will plan to repeat her evaluation in 12 months with repeat fibroscan at that time. The patient will notify me if her routine labs show any changes in her study enzymes prior to this planned return. The patient was directed to continue all current medications at the current dosages. There are no contraindications for the patient to take any medications that are necessary for treatment of other medical issues. The patient was counseled regarding alcohol consumption. Vaccination for viral hepatitis B is recommended since the patient has no serologic evidence of previous exposure or vaccination with immunity. She has had prior administration, but as she is in home health care, would recommend a second course of HBV vaccine to be administered. Vaccination for viral hepatitis A is not needed. The patient has serologic evidence of prior exposure or vaccination with immunity. All of the above issues were discussed with the patient. All questions were answered. The patient expressed a clear understanding of the above. [facility] in 12 months with repeat fibroscan at that time.   Pennelope Earing fibro scan: no fibrosis    Pt takes zantac for reflux  She tries not to use this too frequently, taking once daily but not bid  She is getting some breakthrough  Discussed increase to bid dosing  Advised contacting her GI to find out if she needs a repeat COLO     Pt has chronic back pain  Pt has used tramadol in the past--not currently, tried one recently and it made her feel funny.    She took some note long ago but it gave her insomnia so she has stopped it  Discussed that she can take extra strength tylenol (per Dr Jules Lockhart note)  Discussed muscle relaxer, but pt states this makes her \"loopy\" and nauseous  Discussed gabapentin, pt would like to hold off for now  She is not interested in PT at this time  Discussed imaging--declined    Pt used to take allegra and then, zyrtec    PREVENTIVE:  Colonoscopy: 2013, unsure of repeat, Dr. Neville Negro in [place], advised her to contact  Pap: Dr. Lenin Marie, 4/28/17, complete hysterectomy   Mammogram: 10/17, negative  Dexa: not yet needed  Tdap: 01/09/18   Pneumovax: not yet needed  Zostavax: not yet needed  Shingrix: discussed  01/09/18   Flu shot: 10/17  A1C: 1/18 5.7  Eye exam: Dr. Ibrahim Patient, 2016, every 2 years, due  Lipids: 1/18 91    Patient Active Problem List    Diagnosis Date Noted    Prediabetes 01/09/2018    Elevated liver enzymes 01/09/2018    S/P breast reconstruction 04/28/2017    Breast cancer, right ([facility] [address] 12/02/2015    Weight gain 12/17/2014    H/O total hysterectomy 06/17/2014    Hemorrhoids 01/14/2014    Arthritis      Current Outpatient Prescriptions   Medication Sig Dispense Refill    cholecalciferol (VITAMIN D3) 1,000 unit cap Take  by mouth daily.  raNITIdine (ZANTAC) 150 mg tablet TAKE 1 TAB BY MOUTH TWO (2) TIMES A DAY. 60 Tab 3    traMADol (ULTRAM) 50 mg tablet Take 50 mg by mouth every six (6) hours as needed for Pain. Indications: Pain      tamoxifen (NOLVADEX) 20 mg tablet Take 20 mg by mouth daily.  diclofenac (VOLTAREN) 1 % topical gel Apply 4 g to affected area four (4) times daily as needed.        Past Surgical History:   Procedure Laterality Date    HX BREAST LUMPECTOMY Right 1/25/2016    RIGHT CENTRAL LUMPECTOMY (TYLECTOMY) & SENTINEL LYMPH NODE BIOPSY performed by Nicolas Warren MD at [facility]    HX BREAST RECONSTRUCTION Right 2/10/2017    RIGHT BREAST RECONSTRUCTION WITH BREAST IMPLANT, LEFT BREAST AUGMENTATION AND MASTOPEXY performed by Janette Womack MD at [facility]    HX BREAST REDUCTION Left 2/10/2017    BREAST MASTOPEXY performed by Janette Womack MD at [facility]    HX GYN  1989    ectopic preg and ovarian cystectomy    HX HYSTERECTOMY  2014    HX MASTOPEXY (BREAST LIFT) Left 02/10/2017    HX ORTHOPAEDIC Bilateral 2008    carpel tunnel release both wrists 2 weeks apart    IMPLANT BREAST SILICONE/EQ Bilateral 36/82/1116      Lab Results  Component Value Date/Time   WBC 4.1 01/09/2018 10:16 AM   HGB 14.6 01/09/2018 10:16 AM   HCT 44.2 01/09/2018 10:16 AM   PLATELET 424 63/45/2933 10:16 AM   MCV 91 01/09/2018 10:16 AM     Lab Results  Component Value Date/Time   Cholesterol, total 193 01/09/2018 10:16 AM   HDL Cholesterol 77 01/09/2018 10:16 AM   LDL, calculated 91 01/09/2018 10:16 AM   Triglyceride 125 01/09/2018 10:16 AM     Lab Results  Component Value Date/Time   GFR est non- 01/09/2018 10:16 AM   GFR est  01/09/2018 10:16 AM   Creatinine 0.58 01/09/2018 10:16 AM   BUN 11 01/09/2018 10:16 AM   Sodium 143 01/09/2018 10:16 AM   Potassium 4.6 01/09/2018 10:16 AM   Chloride 101 01/09/2018 10:16 AM   CO2 23 01/09/2018 10:16 AM        Review of Systems   Respiratory: Negative for shortness of breath. Cardiovascular: Negative for chest pain. Musculoskeletal: Positive for back pain. Physical Exam   Constitutional: She is oriented to person, place, and time. She appears well-developed and well-nourished. No distress. HENT:   Head: Normocephalic and atraumatic. Mouth/Throat: Oropharynx is clear and moist. No oropharyngeal exudate. Eyes: Conjunctivae and EOM are normal. Right eye exhibits no discharge. Left eye exhibits no discharge. Neck: Normal range of motion. Neck supple. Cardiovascular: Normal rate, regular rhythm, normal heart sounds and intact distal pulses. Exam reveals no gallop and no friction rub. No murmur heard. Pulmonary/Chest: Effort normal and breath sounds normal. No respiratory distress. She has no wheezes. She has no rales. She exhibits no tenderness. Musculoskeletal: Normal range of motion. She exhibits no edema, tenderness or deformity. Right breast exam --no masses  r axilla--no LAD   Lymphadenopathy:     She has no cervical adenopathy. Neurological: She is alert and oriented to person, place, and time. Coordination normal.   Skin: Skin is warm and dry. No rash noted. She is not diaphoretic. No erythema. No pallor. Psychiatric: She has a normal mood and affect. Her behavior is normal.       ASSESSMENT and PLAN    ICD-10-CM ICD-9-CM    1. Elevated liver enzymes    Following with Dr Jamel Ji, last LFTs actually nl, reviewed his note which states no contraindication to taking meds, discussed tylenol OK, last fibro scan showed no fibrosis   Q65.3 678.4 METABOLIC PANEL, COMPREHENSIVE   2. IFG (impaired fasting glucose)    Check a1c today, evaluate for progression to diabetes, discussed need for continued w/l   R73.01 790.21 HEMOGLOBIN A1C WITH EAG   3. Chronic bilateral low back pain without sciatica    Pt with chronic low back pain, eval in past, discussed repeating L spine plain film and starting PT, she is not interested for now, also discussed flexeril for sx but she is not interested in additional meds for now   M54.5 724.2     G89.29 338.29    4. Breast pain, right    Pt having shooting pain through R breast, has f/o breast cancer on that side, pain started 2 weeks ago, thus will eval further, not interested in any pain meds for now, would consider gabapentin if sx progressive   N64.4 611.71 CARMEN MAMMO RT DX INCL CAD      US BREAST AXILLA RT   5. Overweight    Discussed need for diet and w/l, low carb diet, she had been on Atkins diet which worked well but fell off the bandwagon, she will resume a modified version as the Mila's was too strict to maintain   E66.3 278.02    6. Gastroesophageal reflux disease without esophagitis    Currently taking zantac once daily but getting breakthrough sx, will increase to BID dosing, of note pt may be due for COLO, advised her to contact [place] GI in [place], her last COLO was 5 years ago   K21.9 530.81         Scribed by Nilay Valentino of [facility], as dictated by Dr. Luly Adrian. Current diagnosis and concerns discussed with pt at length.  Pt understands risks and benefits or current treatment plan and medications, and accepts the treatment and medication with any possible risks. Pt asks appropriate questions, which were answered. Pt was instructed to call with any concerns or problems. This note will not be viewable in [address]

## 2018-08-24 ENCOUNTER — TELEPHONE (OUTPATIENT)
Dept: INTERNAL MEDICINE CLINIC | Age: 53
End: 2018-08-24

## 2018-08-24 DIAGNOSIS — Z85.3 PERSONAL HISTORY OF MALIGNANT NEOPLASM OF BREAST: Primary | ICD-10-CM

## 2018-08-24 LAB
ALBUMIN SERPL-MCNC: 4.6 G/DL (ref 3.5–5.5)
ALBUMIN/GLOB SERPL: 1.6 {RATIO} (ref 1.2–2.2)
ALP SERPL-CCNC: 72 IU/L (ref 39–117)
ALT SERPL-CCNC: 22 IU/L (ref 0–32)
AST SERPL-CCNC: 26 IU/L (ref 0–40)
BILIRUB SERPL-MCNC: 0.4 MG/DL (ref 0–1.2)
BUN SERPL-MCNC: 13 MG/DL (ref 6–24)
BUN/CREAT SERPL: 23 (ref 9–23)
CALCIUM SERPL-MCNC: 9.3 MG/DL (ref 8.7–10.2)
CHLORIDE SERPL-SCNC: 102 MMOL/L (ref 96–106)
CO2 SERPL-SCNC: 24 MMOL/L (ref 20–29)
CREAT SERPL-MCNC: 0.56 MG/DL (ref 0.57–1)
EST. AVERAGE GLUCOSE BLD GHB EST-MCNC: 117 MG/DL
GLOBULIN SER CALC-MCNC: 2.8 G/DL (ref 1.5–4.5)
GLUCOSE SERPL-MCNC: 109 MG/DL (ref 65–99)
HBA1C MFR BLD: 5.7 % (ref 4.8–5.6)
POTASSIUM SERPL-SCNC: 5.2 MMOL/L (ref 3.5–5.2)
PROT SERPL-MCNC: 7.4 G/DL (ref 6–8.5)
SODIUM SERPL-SCNC: 143 MMOL/L (ref 134–144)

## 2018-08-24 NOTE — TELEPHONE ENCOUNTER
#[phone] pt states she needs to speak with you about the coding that is on paperwork for the mammogram.  Please call pt pertaining to this. Thanks.

## 2018-08-24 NOTE — TELEPHONE ENCOUNTER
Called, spoke to pt. Two pt identifiers confirmed. Pt states that insurance told her that if pt got only a mammo of R breast, they will not pay for annual mammo bilateral (Nov.). Pt wants bilateral done-order placed. Both breasts can be done at 9/7/18 per pt per insurance. Pt states that her new insurance is allowing her to get done. Pt verbalized understanding of information discussed w/ no further questions at this time.

## 2018-08-24 NOTE — TELEPHONE ENCOUNTER
#[phone]  Please call pt pertaining to coding for her mammogram that is diagnostic (as pt has had previous breast cancer) and if you order for both breast at once insurance will pay instead of doing one now and one later in the year. Diagnostic for both breast.   Only one per calender year is covered. Pt already has an appt scheduled 2 wks from now. Can't be coded as preventive.

## 2018-08-24 NOTE — TELEPHONE ENCOUNTER
Called, spoke to pt. Two pt identifiers confirmed. Pt states that she had a question on the dx code for R mammo and R US. Pt asking if it will be covered given pt is supposed to have her routine annual mammo in November 2018. Pt advised to call insurance to confirm whether or not it will be covered. Pt advised to inform insurance of previous history of breast cancer. Pt advised to call office back if any further action is needed from insurance. Pt verbalized understanding of information discussed w/ no further questions at this time.

## 2018-09-07 ENCOUNTER — HOSPITAL ENCOUNTER (OUTPATIENT)
Dept: MAMMOGRAPHY | Age: 53
Discharge: HOME | End: 2018-09-07
Attending: INTERNAL MEDICINE
Payer: COMMERCIAL

## 2018-09-07 ENCOUNTER — HOSPITAL ENCOUNTER (OUTPATIENT)
Dept: ULTRASOUND IMAGING | Age: 53
Discharge: HOME | End: 2018-09-07
Attending: INTERNAL MEDICINE
Payer: COMMERCIAL

## 2018-09-07 DIAGNOSIS — N64.4 BREAST PAIN, RIGHT: ICD-10-CM

## 2018-09-07 DIAGNOSIS — Z85.3 PERSONAL HISTORY OF MALIGNANT NEOPLASM OF BREAST: ICD-10-CM

## 2018-09-07 PROCEDURE — 77066 DX MAMMO INCL CAD BI: CPT

## 2018-10-24 RX ORDER — RANITIDINE 150 MG/1
TABLET, FILM COATED ORAL
Qty: 60 TAB | Refills: 3 | Status: SHIPPED | OUTPATIENT
Start: 2018-10-24 | End: 2019-01-10 | Stop reason: SDUPTHER

## 2019-01-10 RX ORDER — RANITIDINE 150 MG/1
150 TABLET, FILM COATED ORAL 2 TIMES DAILY
Qty: 180 TAB | Refills: 1 | Status: SHIPPED | OUTPATIENT
Start: 2019-01-10 | End: 2019-07-03 | Stop reason: SDUPTHER

## 2019-01-10 NOTE — TELEPHONE ENCOUNTER
PCP: Nancy Elder MD    Last appt: 8/23/2018  Future Appointments   Date Time Provider Sam Everett   1/18/2019 10:30 AM Nancy Elder MD [facility]       Requested Prescriptions     Pending Prescriptions Disp Refills    raNITIdine (ZANTAC) 150 mg tablet 180 Tab 1     Sig: Take 1 Tab by mouth two (2) times a day.

## 2019-01-18 ENCOUNTER — OFFICE VISIT (OUTPATIENT)
Dept: INTERNAL MEDICINE CLINIC | Age: 54
End: 2019-01-18

## 2019-01-18 VITALS
HEIGHT: 63 IN | BODY MASS INDEX: 29.41 KG/M2 | DIASTOLIC BLOOD PRESSURE: 82 MMHG | OXYGEN SATURATION: 97 % | TEMPERATURE: 98 F | HEART RATE: 86 BPM | WEIGHT: 166 LBS | SYSTOLIC BLOOD PRESSURE: 120 MMHG | RESPIRATION RATE: 16 BRPM

## 2019-01-18 DIAGNOSIS — K21.9 GASTROESOPHAGEAL REFLUX DISEASE WITHOUT ESOPHAGITIS: ICD-10-CM

## 2019-01-18 DIAGNOSIS — Z00.00 PHYSICAL EXAM, ANNUAL: Primary | ICD-10-CM

## 2019-01-18 RX ORDER — PANTOPRAZOLE SODIUM 20 MG/1
20 TABLET, DELAYED RELEASE ORAL DAILY
Qty: 30 TAB | Refills: 1 | Status: SHIPPED | OUTPATIENT
Start: 2019-01-18 | End: 2019-03-16 | Stop reason: SDUPTHER

## 2019-01-18 NOTE — PROGRESS NOTES
HISTORY OF PRESENT ILLNESS  Ann Garcia is a 48 y.o. female. HPI  Last here 8/23/18. Pt is here for routine care.     BP is 120/82    Wt today is 166 lbs --up 10 lbs x lov, up 24 lbs from 1/18  Pt states she went off the low carb diet  Pt was on stanislav's diet previously but this was too strict for her  Pt states she has not been using treadmill d/t foot pain  Discussed focusing on diet and decreasing carbs  Discussed diet and w/l     Reviewed labs 8/18  Pt had labs done today with Dr Amanda Duffy labs    Reviewed mammo     Pt follows with Dr. Arian Yap (hem/onc) for h/o breast cancer  Pt had a lumpectomy in 1/16  Last visit was 01/18/19   Lov, pt c/o breast pain  Reviewed mammo 9/18: nl  Dr Arian Yap told her it was likely nerve pain  Pt will f/u in 6 months  Pt also sees Dr Allen Lea (rad onc)  Pt follows with Dr. Shirley Perez (plastics)   Pt had a breast reconstruction in 2/16  Continues on tamoxifen 20mg daily     Pt follows with Dr. Jyoti Erickson (hepato) for liver test abnormalities  Last visit was 7/13/18     Pt takes zantac BID for reflux  Pt is still having reflux however   Will have her take protonix for 6 weeks  Advised her to get EGD when she gets her COLO     Pt has chronic back pain  Pt has used tramadol in the past--not currently, tried one recently and it made her feel funny.      Pt used to take allegra and then, zyrtec    Pt c/o a lump on her heel, which she has had on and off for 3 years but is more painful  When she wakes up at night it is stiff  Pain has started going upwards  She has an appointment scheduled with podiatry    Pt c/o occasional recurrent brief bouts of vertigo  Discussed there is not usually much to be done for this  Discussed epley maneuver if vertigo comes and stays persistently     PREVENTIVE:  Colonoscopy: 2013, Dr. Carlos Sanches in [place], due now, advised to schedule  Pap: Dr. Marianela Sin, 4/28/17, complete hysterectomy   Mammogram: 9/7/18, negative  Dexa: not yet needed  Tdap: 01/09/18   Pneumovax: not yet needed  Shingrix: not yet needed  Shingrix: discussed  01/19  Flu shot: Fall 2018  A1C: 1/18 5.7, 8/18 5.7  Eye exam: Dr. Elba Ashford, 2016, every 2 years, due, reminded  Lipids: 1/18 91    Patient Active Problem List    Diagnosis Date Noted    Prediabetes 01/09/2018    Elevated liver enzymes 01/09/2018    S/P breast reconstruction 04/28/2017    Breast cancer, right ([address] 12/02/2015    Weight gain 12/17/2014    H/O total hysterectomy 06/17/2014    Hemorrhoids 01/14/2014    Arthritis      Current Outpatient Medications   Medication Sig Dispense Refill    raNITIdine (ZANTAC) 150 mg tablet Take 1 Tab by mouth two (2) times a day. 180 Tab 1    cholecalciferol (VITAMIN D3) 1,000 unit cap Take  by mouth daily.  traMADol (ULTRAM) 50 mg tablet Take 50 mg by mouth every six (6) hours as needed for Pain. Indications: Pain      tamoxifen (NOLVADEX) 20 mg tablet Take 20 mg by mouth daily.  diclofenac (VOLTAREN) 1 % topical gel Apply 4 g to affected area four (4) times daily as needed.        Past Surgical History:   Procedure Laterality Date    HX BREAST LUMPECTOMY Right 1/25/2016    RIGHT CENTRAL LUMPECTOMY (TYLECTOMY) & SENTINEL LYMPH NODE BIOPSY performed by Jayne Rios MD at [facility]    HX BREAST RECONSTRUCTION Right 2/10/2017    RIGHT BREAST RECONSTRUCTION WITH BREAST IMPLANT, LEFT BREAST AUGMENTATION AND MASTOPEXY performed by Coretta Kayser, MD at [facility]    HX BREAST REDUCTION Left 2/10/2017    BREAST MASTOPEXY performed by Coretta Kayser, MD at [facility]    HX GYN  1989    ectopic preg and ovarian cystectomy    HX HYSTERECTOMY  2014    HX MASTOPEXY (BREAST LIFT) Left 02/10/2017    HX ORTHOPAEDIC Bilateral 2008    carpel tunnel release both wrists 2 weeks apart    IMPLANT BREAST SILICONE/EQ Bilateral 95/76/1162      Lab Results   Component Value Date/Time    WBC 4.1 01/09/2018 10:16 AM    HGB 14.6 01/09/2018 10:16 AM    HCT 44.2 01/09/2018 10:16 AM    PLATELET 286 01/09/2018 10:16 AM    MCV 91 01/09/2018 10:16 AM     Lab Results   Component Value Date/Time    Cholesterol, total 193 01/09/2018 10:16 AM    HDL Cholesterol 77 01/09/2018 10:16 AM    LDL, calculated 91 01/09/2018 10:16 AM    Triglyceride 125 01/09/2018 10:16 AM     Lab Results   Component Value Date/Time    GFR est non- 08/23/2018 08:15 AM    GFR est  08/23/2018 08:15 AM    Creatinine 0.56 (L) 08/23/2018 08:15 AM    BUN 13 08/23/2018 08:15 AM    Sodium 143 08/23/2018 08:15 AM    Potassium 5.2 08/23/2018 08:15 AM    Chloride 102 08/23/2018 08:15 AM    CO2 24 08/23/2018 08:15 AM        Review of Systems   Respiratory: Negative for shortness of breath. Cardiovascular: Negative for chest pain. Gastrointestinal: Positive for heartburn. Physical Exam   Constitutional: She is oriented to person, place, and time. She appears well-developed and well-nourished. No distress. HENT:   Head: Normocephalic and atraumatic. Right Ear: External ear normal.   Left Ear: External ear normal.   Mouth/Throat: Oropharynx is clear and moist. No oropharyngeal exudate. Eyes: Conjunctivae and EOM are normal. Right eye exhibits no discharge. Left eye exhibits no discharge. Neck: Normal range of motion. Neck supple. No carotid bruits    Cardiovascular: Normal rate, regular rhythm and normal heart sounds. Exam reveals no gallop and no friction rub. No murmur heard. Pulmonary/Chest: Effort normal and breath sounds normal. No respiratory distress. She has no wheezes. She has no rales. She exhibits no tenderness. Abdominal: Soft. She exhibits no distension and no mass. There is no tenderness. There is no rebound and no guarding. Musculoskeletal: Normal range of motion. She exhibits no edema, tenderness or deformity. Lymphadenopathy:     She has no cervical adenopathy. Neurological: She is alert and oriented to person, place, and time. Coordination normal.   Skin: Skin is warm and dry. No rash noted. She is not diaphoretic. No erythema. No pallor. Psychiatric: She has a normal mood and affect. Her behavior is normal.       ASSESSMENT and PLAN    ICD-10-CM ICD-9-CM    1. Physical exam, annual    Due for labs, ordered, COLO, eye exam, and pelvic are due, discussed, flu shot UTD, needs to get shingrix at local pharmacy, discussed diet and w/l, discussed low carb diet   Z00.00 V70.0 LIPID PANEL      TSH 3RD GENERATION      HEMOGLOBIN A1C WITH EAG      METABOLIC PANEL, COMPREHENSIVE   2. Gastroesophageal reflux disease without esophagitis    Start protonix daily for 6 weeks and then taper of to zantac, discuss with GI doctor   K21.9 530.81         Scribed by Ovidio Chapa [address] [place] [address] [address], as dictated by Dr. Kath Corea. Current diagnosis and concerns discussed with pt at length. Pt understands risks and benefits or current treatment plan and medications, and accepts the treatment and medication with any possible risks. Pt asks appropriate questions, which were answered. Pt was instructed to call with any concerns or problems. I have reviewed the note documented by the scribe. The services provided are my own.   The documentation is accurate

## 2019-01-18 NOTE — PATIENT INSTRUCTIONS
SCHEDULE YOUR COLONOSCOPY AND EYE EXAM     PELVIC EXAM THIS [address]     protonix daily for 6 weeks then taper off    Discuss reflux with GI doctor    Labs today

## 2019-01-19 LAB
ALBUMIN SERPL-MCNC: 4.8 G/DL (ref 3.5–5.5)
ALBUMIN/GLOB SERPL: 1.8 {RATIO} (ref 1.2–2.2)
ALP SERPL-CCNC: 72 IU/L (ref 39–117)
ALT SERPL-CCNC: 47 IU/L (ref 0–32)
AST SERPL-CCNC: 32 IU/L (ref 0–40)
BILIRUB SERPL-MCNC: 0.4 MG/DL (ref 0–1.2)
BUN SERPL-MCNC: 14 MG/DL (ref 6–24)
BUN/CREAT SERPL: 23 (ref 9–23)
CALCIUM SERPL-MCNC: 9.4 MG/DL (ref 8.7–10.2)
CHLORIDE SERPL-SCNC: 104 MMOL/L (ref 96–106)
CHOLEST SERPL-MCNC: 191 MG/DL (ref 100–199)
CO2 SERPL-SCNC: 22 MMOL/L (ref 20–29)
CREAT SERPL-MCNC: 0.6 MG/DL (ref 0.57–1)
EST. AVERAGE GLUCOSE BLD GHB EST-MCNC: 123 MG/DL
GLOBULIN SER CALC-MCNC: 2.7 G/DL (ref 1.5–4.5)
GLUCOSE SERPL-MCNC: 109 MG/DL (ref 65–99)
HBA1C MFR BLD: 5.9 % (ref 4.8–5.6)
HDLC SERPL-MCNC: 71 MG/DL
LDLC SERPL CALC-MCNC: 104 MG/DL (ref 0–99)
POTASSIUM SERPL-SCNC: 5 MMOL/L (ref 3.5–5.2)
PROT SERPL-MCNC: 7.5 G/DL (ref 6–8.5)
SODIUM SERPL-SCNC: 144 MMOL/L (ref 134–144)
TRIGL SERPL-MCNC: 81 MG/DL (ref 0–149)
TSH SERPL DL<=0.005 MIU/L-ACNC: 2.33 UIU/ML (ref 0.45–4.5)
VLDLC SERPL CALC-MCNC: 16 MG/DL (ref 5–40)

## 2019-01-19 NOTE — PROGRESS NOTES
Slight bump in lft    Normal last check    Lets repeat heaptic fxn panel and acute hepatitis panel in 3-4 weeks    Rest fine/stable

## 2019-01-21 DIAGNOSIS — R79.89 LFT ELEVATION: Primary | ICD-10-CM

## 2019-01-21 NOTE — PROGRESS NOTES
Spoke with Pt. Two pt identifiers confirmed. Pt informed per Dr. Aleah Doan there was a slight bump in lft which was normal last time, wants to repeat heaptic fxn panel and acute hepatitis panel in 3-4 weeks, and rest of labs are fine/stable. Labs ordered and mailed to pt. Pt verbalized understanding of information discussed w/ no further questions at this time.

## 2019-02-21 ENCOUNTER — TELEPHONE (OUTPATIENT)
Dept: INTERNAL MEDICINE CLINIC | Age: 54
End: 2019-02-21

## 2019-02-21 NOTE — TELEPHONE ENCOUNTER
Pt calling stating that she was put on a medication for her stomach back in January and was told to start weaning off of it. She would like to know exactly how she should wean herself off of the medication the proper way. She stated she is at work so if she is unable to answer that a detailed VM may be left. Best contact [phone].        Message received & copied from [facility]

## 2019-02-22 NOTE — TELEPHONE ENCOUNTER
MD Esvin Luz LPN   Caller: Unspecified (Yesterday,  8:22 AM)             Take it every other day for 2 weeks then 3 times per week for 2 weeks then stop

## 2019-02-22 NOTE — TELEPHONE ENCOUNTER
Called spoke to pt. Two pt identifiers confirmed. Pt informed per Dr. Gavi Duarte to take Protonix  every other day for 2 weeks then 3 times per week for 2 weeks then stop. Pt verbalized understanding of information discussed w/ no further questions at this time.

## 2019-02-23 LAB
ALBUMIN SERPL-MCNC: 4.6 G/DL (ref 3.5–5.5)
ALP SERPL-CCNC: 77 IU/L (ref 39–117)
ALT SERPL-CCNC: 57 IU/L (ref 0–32)
AST SERPL-CCNC: 39 IU/L (ref 0–40)
BILIRUB DIRECT SERPL-MCNC: 0.11 MG/DL (ref 0–0.4)
BILIRUB SERPL-MCNC: 0.3 MG/DL (ref 0–1.2)
HAV IGM SERPL QL IA: NEGATIVE
HBV CORE IGM SERPL QL IA: NEGATIVE
HBV SURFACE AG SERPL QL IA: NEGATIVE
HCV AB S/CO SERPL IA: <0.1 S/CO RATIO (ref 0–0.9)
PROT SERPL-MCNC: 7.4 G/DL (ref 6–8.5)

## 2019-02-26 NOTE — TELEPHONE ENCOUNTER
Let her know stable elevation of lft    See dr Bueno Orf office for f/u this summer for repeat fibroscan (she saw them last in 7/18 request was 1 year f/u )    Work on wt loss

## 2019-02-26 NOTE — TELEPHONE ENCOUNTER
Called, spoke to pt. Two identifiers confirmed. Notified pt of lab results/recommendations per Dr. Genoveva Steele. Pt verbalized understanding of information discussed w/ no further questions at this time.

## 2019-03-16 RX ORDER — PANTOPRAZOLE SODIUM 20 MG/1
TABLET, DELAYED RELEASE ORAL
Qty: 30 TAB | Refills: 1 | Status: SHIPPED | OUTPATIENT
Start: 2019-03-16 | End: 2019-03-20 | Stop reason: SDUPTHER

## 2019-03-20 RX ORDER — PANTOPRAZOLE SODIUM 20 MG/1
20 TABLET, DELAYED RELEASE ORAL DAILY
Qty: 90 TAB | Refills: 1 | Status: SHIPPED | OUTPATIENT
Start: 2019-03-20 | End: 2019-08-09

## 2019-03-20 NOTE — TELEPHONE ENCOUNTER
PCP: Luis Miguel Jauregui MD    Last appt: 1/18/2019  Future Appointments   Date Time Provider Sam Everett   1/24/2020 10:30 AM Luis Miguel Jauregui MD [facility]       Requested Prescriptions     Pending Prescriptions Disp Refills    pantoprazole (PROTONIX) 20 mg tablet 90 Tab 1     Sig: Take 1 Tab by mouth daily.

## 2019-03-22 RX ORDER — HYDROGEN PEROXIDE 3 %
20 SOLUTION, NON-ORAL MISCELLANEOUS DAILY
Qty: 30 CAP | Refills: 3 | Status: SHIPPED | OUTPATIENT
Start: 2019-03-22 | End: 2019-08-09

## 2019-04-19 ENCOUNTER — TELEPHONE (OUTPATIENT)
Dept: INTERNAL MEDICINE CLINIC | Age: 54
End: 2019-04-19

## 2019-04-19 NOTE — TELEPHONE ENCOUNTER
Called, spoke to pt. Two pt identifiers confirmed. Pt states having a def of vitamin D. Pt on 1000U daily. Pt informed per Dr. Tao Venegas to continue 1000U daily of vitamin D. Pt verbalized understanding of information discussed w/ no further questions at this time.

## 2019-04-19 NOTE — TELEPHONE ENCOUNTER
Pt requesting a call back regarding seeing if she needs to continue to taking Vitamin D supplement.  Best [phone]       Message received & copied from Laurie Welsh

## 2019-07-03 RX ORDER — RANITIDINE 150 MG/1
TABLET, FILM COATED ORAL
Qty: 180 TAB | Refills: 1 | Status: SHIPPED | OUTPATIENT
Start: 2019-07-03 | End: 2019-10-02

## 2019-07-13 ENCOUNTER — HOSPITAL ENCOUNTER (OUTPATIENT)
Dept: MRI IMAGING | Age: 54
Discharge: HOME | End: 2019-07-13
Attending: ORTHOPAEDIC SURGERY
Payer: COMMERCIAL

## 2019-07-13 DIAGNOSIS — M67.88 ACHILLES TENDINOSIS OF LEFT LOWER EXTREMITY: ICD-10-CM

## 2019-07-13 PROCEDURE — 73721 MRI JNT OF LWR EXTRE W/O DYE: CPT

## 2019-07-15 ENCOUNTER — TELEPHONE (OUTPATIENT)
Dept: INTERNAL MEDICINE CLINIC | Age: 54
End: 2019-07-15

## 2019-07-15 NOTE — TELEPHONE ENCOUNTER
Spoke to pt. Reported that appointment would be needed. Pt works till [employer] and lives in [facility] [place]. Pt [facility] for [facility]. Will call back for appointment if needed.

## 2019-07-15 NOTE — TELEPHONE ENCOUNTER
Pt states she has had a stye for a week and would like a script called in for that. There is nothing OTC per pharmacist.      Please call something into [facility] on file. Any questions please call pt.

## 2019-08-09 RX ORDER — MINERAL OIL
180 ENEMA (ML) RECTAL
COMMUNITY

## 2019-08-09 NOTE — PERIOP NOTES
[facility]  Ambulatory Surgery Unit  Pre-operative Instructions    Surgery/Procedure Date  8/13/19           Tentative Arrival Time 1100      1. On the day of your surgery/procedure, please report to the Ambulatory Surgery Unit Registration Desk and sign in at your designated time. The Ambulatory Surgery Unit is located in [facility] on the [facility] side of the [facility] across from the [address] [place] [address]. Please have all of your health insurance cards and a photo ID. 2. You must have someone with you to drive you home, as you should not drive a car for 24 hours following anesthesia. Please make arrangements for a responsible adult friend or family member to stay with you for at least the first 24 hours after your surgery. 3. Do not have anything to eat or drink (including water, gum, mints, coffee, juice) after 11:59 PM  8/12/19. This may not apply to medications prescribed by your physician. (Please note below the special instructions with medications to take the morning of surgery, if applicable.)    4. We recommend you do not drink any alcoholic beverages for 24 hours before and after your surgery. 5. Contact your surgeons office for instructions on the following medications: non-steroidal anti-inflammatory drugs (i.e. Advil, Aleve), vitamins, and supplements. (Some surgeons will want you to stop these medications prior to surgery and others may allow you to take them)   **If you are currently taking Plavix, Coumadin, Aspirin and/or other blood-thinning agents, contact your surgeon for instructions. ** Your surgeon will partner with the physician prescribing these medications to determine if it is safe to stop or if you need to continue taking. Please do not stop taking these medications without instructions from your surgeon.     6. In an effort to help prevent surgical site infection, we ask that you shower with an anti-bacterial soap (i.e. Dial/Safeguard, or the soap provided to you at your preadmission testing appointment) for 3 days prior to and on the morning of surgery, using a fresh towel after each shower. (Please begin this process with fresh bed linens.) Do not apply any lotions, powders, or deodorants after the shower on the day of your procedure. If applicable, please do not shave the operative site for 48 hours prior to surgery. 7. Wear comfortable clothes. Wear glasses instead of contacts. Do not bring any jewelry or money (other than copays or fees as instructed). Do not wear make-up, particularly mascara, the morning of your surgery. Do not wear nail polish, particularly if you are having foot /hand surgery. Wear your hair loose or down, no ponytails, buns, tash pins or clips. All body piercings must be removed. 8. You should understand that if you do not follow these instructions your surgery may be cancelled. If your physical condition changes (i.e. fever, cold or flu) please contact your surgeon as soon as possible. 9. It is important that you be on time. If a situation occurs where you may be late, or if you have any questions or problems, please call [phone].    10. Your surgery time may be subject to change. You will receive a phone call the day prior to surgery to confirm your arrival time. 11. Pediatric patients: please bring a change of clothes, diapers, bottle/sippy cup, pacifier, etc.      Special Instructions: Take all medications and inhalers, as prescribed, on the morning of surgery with a sip of water EXCEPT: none       Insulin Dependent Diabetic patients: Take your diabetic medications as prescribed the day before surgery. Hold all diabetic medications the day of surgery. If you are scheduled to arrive for surgery after 8:00 AM, and your AM blood sugar is >200, please call Ambulatory Surgery. I understand a pre-operative phone call will be made to verify my surgery time.   In the event that I am not available, I give permission for a message to be left on my answering service and/or with another person?       YES    The above note was reviewed with patient during PAT phone call on 8/9/19, patient verbalized understanding.            ___________________      ___________________      ________________  (Signature of Patient)          (Witness)                   (Date and Time)

## 2019-08-12 ENCOUNTER — HOSPITAL ENCOUNTER (OUTPATIENT)
Dept: SURGERY | Age: 54
Setting detail: OUTPATIENT SURGERY
Discharge: HOME | End: 2019-08-12
Payer: COMMERCIAL

## 2019-08-12 ENCOUNTER — ANESTHESIA EVENT (OUTPATIENT)
Dept: SURGERY | Age: 54
End: 2019-08-12
Payer: COMMERCIAL

## 2019-08-12 VITALS
SYSTOLIC BLOOD PRESSURE: 137 MMHG | DIASTOLIC BLOOD PRESSURE: 81 MMHG | TEMPERATURE: 98.6 F | HEART RATE: 87 BPM | OXYGEN SATURATION: 98 %

## 2019-08-12 LAB
ANION GAP SERPL CALC-SCNC: 8 MMOL/L (ref 5–15)
ATRIAL RATE: 80 BPM
BASOPHILS # BLD: 0 K/UL (ref 0–0.1)
BASOPHILS NFR BLD: 1 % (ref 0–1)
BUN SERPL-MCNC: 11 MG/DL (ref 6–20)
BUN/CREAT SERPL: 16 (ref 12–20)
CALCIUM SERPL-MCNC: 8.8 MG/DL (ref 8.5–10.1)
CALCULATED P AXIS, ECG09: 49 DEGREES
CALCULATED R AXIS, ECG10: -9 DEGREES
CALCULATED T AXIS, ECG11: 23 DEGREES
CHLORIDE SERPL-SCNC: 105 MMOL/L (ref 97–108)
CO2 SERPL-SCNC: 26 MMOL/L (ref 21–32)
CREAT SERPL-MCNC: 0.67 MG/DL (ref 0.55–1.02)
DIAGNOSIS, 93000: NORMAL
DIFFERENTIAL METHOD BLD: NORMAL
EOSINOPHIL # BLD: 0 K/UL (ref 0–0.4)
EOSINOPHIL NFR BLD: 1 % (ref 0–7)
ERYTHROCYTE [DISTWIDTH] IN BLOOD BY AUTOMATED COUNT: 12.4 % (ref 11.5–14.5)
EST. AVERAGE GLUCOSE BLD GHB EST-MCNC: 126 MG/DL
GLUCOSE SERPL-MCNC: 111 MG/DL (ref 65–100)
HBA1C MFR BLD: 6 % (ref 4.2–6.3)
HCT VFR BLD AUTO: 43.8 % (ref 35–47)
HGB BLD-MCNC: 14.6 G/DL (ref 11.5–16)
IMM GRANULOCYTES # BLD AUTO: 0 K/UL (ref 0–0.04)
IMM GRANULOCYTES NFR BLD AUTO: 0 % (ref 0–0.5)
LYMPHOCYTES # BLD: 1.4 K/UL (ref 0.8–3.5)
LYMPHOCYTES NFR BLD: 33 % (ref 12–49)
MCH RBC QN AUTO: 29.9 PG (ref 26–34)
MCHC RBC AUTO-ENTMCNC: 33.3 G/DL (ref 30–36.5)
MCV RBC AUTO: 89.8 FL (ref 80–99)
MONOCYTES # BLD: 0.4 K/UL (ref 0–1)
MONOCYTES NFR BLD: 10 % (ref 5–13)
NEUTS SEG # BLD: 2.4 K/UL (ref 1.8–8)
NEUTS SEG NFR BLD: 55 % (ref 32–75)
NRBC # BLD: 0 K/UL (ref 0–0.01)
NRBC BLD-RTO: 0 PER 100 WBC
P-R INTERVAL, ECG05: 132 MS
PLATELET # BLD AUTO: 236 K/UL (ref 150–400)
PMV BLD AUTO: 10 FL (ref 8.9–12.9)
POTASSIUM SERPL-SCNC: 4.1 MMOL/L (ref 3.5–5.1)
Q-T INTERVAL, ECG07: 372 MS
QRS DURATION, ECG06: 74 MS
QTC CALCULATION (BEZET), ECG08: 429 MS
RBC # BLD AUTO: 4.88 M/UL (ref 3.8–5.2)
SODIUM SERPL-SCNC: 139 MMOL/L (ref 136–145)
VENTRICULAR RATE, ECG03: 80 BPM
WBC # BLD AUTO: 4.4 K/UL (ref 3.6–11)

## 2019-08-12 PROCEDURE — 36415 COLL VENOUS BLD VENIPUNCTURE: CPT

## 2019-08-12 PROCEDURE — 80048 BASIC METABOLIC PNL TOTAL CA: CPT

## 2019-08-12 PROCEDURE — 85025 COMPLETE CBC W/AUTO DIFF WBC: CPT

## 2019-08-12 PROCEDURE — 93005 ELECTROCARDIOGRAM TRACING: CPT

## 2019-08-12 PROCEDURE — 83036 HEMOGLOBIN GLYCOSYLATED A1C: CPT

## 2019-08-13 ENCOUNTER — ANESTHESIA (OUTPATIENT)
Dept: SURGERY | Age: 54
End: 2019-08-13
Payer: COMMERCIAL

## 2019-08-13 ENCOUNTER — APPOINTMENT (OUTPATIENT)
Dept: GENERAL RADIOLOGY | Age: 54
End: 2019-08-13
Attending: ORTHOPAEDIC SURGERY
Payer: COMMERCIAL

## 2019-08-13 ENCOUNTER — HOSPITAL ENCOUNTER (OUTPATIENT)
Age: 54
Setting detail: OUTPATIENT SURGERY
Discharge: HOME | End: 2019-08-13
Attending: ORTHOPAEDIC SURGERY | Admitting: ORTHOPAEDIC SURGERY
Payer: COMMERCIAL

## 2019-08-13 VITALS
BODY MASS INDEX: 30.55 KG/M2 | TEMPERATURE: 97.7 F | RESPIRATION RATE: 13 BRPM | HEIGHT: 62 IN | OXYGEN SATURATION: 95 % | HEART RATE: 77 BPM | SYSTOLIC BLOOD PRESSURE: 121 MMHG | WEIGHT: 166 LBS | DIASTOLIC BLOOD PRESSURE: 77 MMHG

## 2019-08-13 PROCEDURE — 77030018836 HC SOL IRR NACL ICUM -A: Performed by: ORTHOPAEDIC SURGERY

## 2019-08-13 PROCEDURE — 74011000272 HC RX REV CODE- 272: Performed by: ORTHOPAEDIC SURGERY

## 2019-08-13 PROCEDURE — 74011250636 HC RX REV CODE- 250/636: Performed by: ANESTHESIOLOGY

## 2019-08-13 PROCEDURE — 77030031139 HC SUT VCRL2 J&J -A: Performed by: ORTHOPAEDIC SURGERY

## 2019-08-13 PROCEDURE — 76210000050 HC AMBSU PH II REC 0.5 TO 1 HR: Performed by: ORTHOPAEDIC SURGERY

## 2019-08-13 PROCEDURE — 74011250636 HC RX REV CODE- 250/636: Performed by: NURSE ANESTHETIST, CERTIFIED REGISTERED

## 2019-08-13 PROCEDURE — 77030011640 HC PAD GRND REM COVD -A: Performed by: ORTHOPAEDIC SURGERY

## 2019-08-13 PROCEDURE — 77030019908 HC STETH ESOPH SIMS -A: Performed by: NURSE ANESTHETIST, CERTIFIED REGISTERED

## 2019-08-13 PROCEDURE — 73600 X-RAY EXAM OF ANKLE: CPT

## 2019-08-13 PROCEDURE — 77030020255 HC SOL INJ LR 1000ML BG: Performed by: ORTHOPAEDIC SURGERY

## 2019-08-13 PROCEDURE — 74011000250 HC RX REV CODE- 250

## 2019-08-13 PROCEDURE — 77030013079 HC BLNKT BAIR HGGR 3M -A: Performed by: NURSE ANESTHETIST, CERTIFIED REGISTERED

## 2019-08-13 PROCEDURE — 76210000040 HC AMBSU PH I REC FIRST 0.5 HR: Performed by: ORTHOPAEDIC SURGERY

## 2019-08-13 PROCEDURE — C1713 ANCHOR/SCREW BN/BN,TIS/BN: HCPCS | Performed by: ORTHOPAEDIC SURGERY

## 2019-08-13 PROCEDURE — 77030008684 HC TU ET CUF COVD -B: Performed by: NURSE ANESTHETIST, CERTIFIED REGISTERED

## 2019-08-13 PROCEDURE — 76060000063 HC AMB SURG ANES 1.5 TO 2 HR: Performed by: ORTHOPAEDIC SURGERY

## 2019-08-13 PROCEDURE — 74011250637 HC RX REV CODE- 250/637

## 2019-08-13 PROCEDURE — 74011000250 HC RX REV CODE- 250: Performed by: ORTHOPAEDIC SURGERY

## 2019-08-13 PROCEDURE — 76000 FLUOROSCOPY <1 HR PHYS/QHP: CPT

## 2019-08-13 PROCEDURE — 77030026438 HC STYL ET INTUB CARD -A: Performed by: NURSE ANESTHETIST, CERTIFIED REGISTERED

## 2019-08-13 PROCEDURE — 77030021352 HC CBL LD SYS DISP COVD -B: Performed by: ORTHOPAEDIC SURGERY

## 2019-08-13 PROCEDURE — 74011250636 HC RX REV CODE- 250/636: Performed by: ORTHOPAEDIC SURGERY

## 2019-08-13 PROCEDURE — 76030000003 HC AMB SURG OR TIME 1.5 TO 2: Performed by: ORTHOPAEDIC SURGERY

## 2019-08-13 RX ORDER — SCOLOPAMINE TRANSDERMAL SYSTEM 1 MG/1
1 PATCH, EXTENDED RELEASE TRANSDERMAL ONCE
Status: DISCONTINUED | OUTPATIENT
Start: 2019-08-13 | End: 2019-08-13 | Stop reason: HOSPADM

## 2019-08-13 RX ORDER — SODIUM CHLORIDE 0.9 % (FLUSH) 0.9 %
5-40 SYRINGE (ML) INJECTION EVERY 8 HOURS
Status: DISCONTINUED | OUTPATIENT
Start: 2019-08-13 | End: 2019-08-13 | Stop reason: HOSPADM

## 2019-08-13 RX ORDER — FENTANYL CITRATE 50 UG/ML
INJECTION, SOLUTION INTRAMUSCULAR; INTRAVENOUS AS NEEDED
Status: DISCONTINUED | OUTPATIENT
Start: 2019-08-13 | End: 2019-08-13 | Stop reason: HOSPADM

## 2019-08-13 RX ORDER — ROPIVACAINE HYDROCHLORIDE 5 MG/ML
INJECTION, SOLUTION EPIDURAL; INFILTRATION; PERINEURAL
Status: COMPLETED
Start: 2019-08-13 | End: 2019-08-13

## 2019-08-13 RX ORDER — PROPOFOL 10 MG/ML
INJECTION, EMULSION INTRAVENOUS AS NEEDED
Status: DISCONTINUED | OUTPATIENT
Start: 2019-08-13 | End: 2019-08-13 | Stop reason: HOSPADM

## 2019-08-13 RX ORDER — PHENYLEPHRINE HCL IN 0.9% NACL 0.4MG/10ML
SYRINGE (ML) INTRAVENOUS AS NEEDED
Status: DISCONTINUED | OUTPATIENT
Start: 2019-08-13 | End: 2019-08-13 | Stop reason: HOSPADM

## 2019-08-13 RX ORDER — DIPHENHYDRAMINE HYDROCHLORIDE 50 MG/ML
12.5 INJECTION, SOLUTION INTRAMUSCULAR; INTRAVENOUS AS NEEDED
Status: DISCONTINUED | OUTPATIENT
Start: 2019-08-13 | End: 2019-08-13 | Stop reason: HOSPADM

## 2019-08-13 RX ORDER — MIDAZOLAM HYDROCHLORIDE 1 MG/ML
INJECTION, SOLUTION INTRAMUSCULAR; INTRAVENOUS AS NEEDED
Status: DISCONTINUED | OUTPATIENT
Start: 2019-08-13 | End: 2019-08-13 | Stop reason: HOSPADM

## 2019-08-13 RX ORDER — SUCCINYLCHOLINE CHLORIDE 20 MG/ML
INJECTION INTRAMUSCULAR; INTRAVENOUS AS NEEDED
Status: DISCONTINUED | OUTPATIENT
Start: 2019-08-13 | End: 2019-08-13 | Stop reason: HOSPADM

## 2019-08-13 RX ORDER — OXYCODONE AND ACETAMINOPHEN 5; 325 MG/1; MG/1
1 TABLET ORAL
Status: DISCONTINUED | OUTPATIENT
Start: 2019-08-13 | End: 2019-08-13 | Stop reason: HOSPADM

## 2019-08-13 RX ORDER — LIDOCAINE HYDROCHLORIDE 20 MG/ML
INJECTION, SOLUTION EPIDURAL; INFILTRATION; INTRACAUDAL; PERINEURAL AS NEEDED
Status: DISCONTINUED | OUTPATIENT
Start: 2019-08-13 | End: 2019-08-13 | Stop reason: HOSPADM

## 2019-08-13 RX ORDER — SODIUM CHLORIDE, SODIUM LACTATE, POTASSIUM CHLORIDE, CALCIUM CHLORIDE 600; 310; 30; 20 MG/100ML; MG/100ML; MG/100ML; MG/100ML
25 INJECTION, SOLUTION INTRAVENOUS CONTINUOUS
Status: DISCONTINUED | OUTPATIENT
Start: 2019-08-13 | End: 2019-08-13 | Stop reason: HOSPADM

## 2019-08-13 RX ORDER — SODIUM CHLORIDE 0.9 % (FLUSH) 0.9 %
5-40 SYRINGE (ML) INJECTION AS NEEDED
Status: DISCONTINUED | OUTPATIENT
Start: 2019-08-13 | End: 2019-08-13 | Stop reason: HOSPADM

## 2019-08-13 RX ORDER — ONDANSETRON 2 MG/ML
INJECTION INTRAMUSCULAR; INTRAVENOUS AS NEEDED
Status: DISCONTINUED | OUTPATIENT
Start: 2019-08-13 | End: 2019-08-13 | Stop reason: HOSPADM

## 2019-08-13 RX ORDER — MORPHINE SULFATE 10 MG/ML
2 INJECTION, SOLUTION INTRAMUSCULAR; INTRAVENOUS
Status: DISCONTINUED | OUTPATIENT
Start: 2019-08-13 | End: 2019-08-13 | Stop reason: HOSPADM

## 2019-08-13 RX ORDER — PROPOFOL 10 MG/ML
INJECTION, EMULSION INTRAVENOUS
Status: DISCONTINUED | OUTPATIENT
Start: 2019-08-13 | End: 2019-08-13 | Stop reason: HOSPADM

## 2019-08-13 RX ORDER — CEFAZOLIN SODIUM/WATER 2 G/20 ML
2 SYRINGE (ML) INTRAVENOUS ONCE
Status: COMPLETED | OUTPATIENT
Start: 2019-08-13 | End: 2019-08-13

## 2019-08-13 RX ORDER — MIDAZOLAM HYDROCHLORIDE 1 MG/ML
INJECTION, SOLUTION INTRAMUSCULAR; INTRAVENOUS
Status: COMPLETED
Start: 2019-08-13 | End: 2019-08-13

## 2019-08-13 RX ORDER — ROPIVACAINE HYDROCHLORIDE 5 MG/ML
INJECTION, SOLUTION EPIDURAL; INFILTRATION; PERINEURAL
Status: COMPLETED | OUTPATIENT
Start: 2019-08-13 | End: 2019-08-13

## 2019-08-13 RX ORDER — FENTANYL CITRATE 50 UG/ML
INJECTION, SOLUTION INTRAMUSCULAR; INTRAVENOUS
Status: COMPLETED
Start: 2019-08-13 | End: 2019-08-13

## 2019-08-13 RX ORDER — DEXAMETHASONE SODIUM PHOSPHATE 4 MG/ML
INJECTION, SOLUTION INTRA-ARTICULAR; INTRALESIONAL; INTRAMUSCULAR; INTRAVENOUS; SOFT TISSUE AS NEEDED
Status: DISCONTINUED | OUTPATIENT
Start: 2019-08-13 | End: 2019-08-13 | Stop reason: HOSPADM

## 2019-08-13 RX ORDER — FENTANYL CITRATE 50 UG/ML
25 INJECTION, SOLUTION INTRAMUSCULAR; INTRAVENOUS
Status: DISCONTINUED | OUTPATIENT
Start: 2019-08-13 | End: 2019-08-13 | Stop reason: HOSPADM

## 2019-08-13 RX ORDER — SCOLOPAMINE TRANSDERMAL SYSTEM 1 MG/1
PATCH, EXTENDED RELEASE TRANSDERMAL
Status: DISCONTINUED
Start: 2019-08-13 | End: 2019-08-13 | Stop reason: HOSPADM

## 2019-08-13 RX ORDER — HYDROMORPHONE HYDROCHLORIDE 1 MG/ML
.2-.5 INJECTION, SOLUTION INTRAMUSCULAR; INTRAVENOUS; SUBCUTANEOUS ONCE
Status: DISCONTINUED | OUTPATIENT
Start: 2019-08-13 | End: 2019-08-13 | Stop reason: HOSPADM

## 2019-08-13 RX ORDER — LIDOCAINE HYDROCHLORIDE 10 MG/ML
0.1 INJECTION, SOLUTION EPIDURAL; INFILTRATION; INTRACAUDAL; PERINEURAL AS NEEDED
Status: DISCONTINUED | OUTPATIENT
Start: 2019-08-13 | End: 2019-08-13 | Stop reason: HOSPADM

## 2019-08-13 RX ADMIN — SUCCINYLCHOLINE CHLORIDE 120 MG: 20 INJECTION, SOLUTION INTRAMUSCULAR; INTRAVENOUS at 12:34

## 2019-08-13 RX ADMIN — SODIUM CHLORIDE, SODIUM LACTATE, POTASSIUM CHLORIDE, AND CALCIUM CHLORIDE 25 ML/HR: 600; 310; 30; 20 INJECTION, SOLUTION INTRAVENOUS at 11:20

## 2019-08-13 RX ADMIN — ROPIVACAINE HYDROCHLORIDE 40 ML: 5 INJECTION, SOLUTION EPIDURAL; INFILTRATION; PERINEURAL at 12:20

## 2019-08-13 RX ADMIN — MIDAZOLAM HYDROCHLORIDE 3 MG: 1 INJECTION, SOLUTION INTRAMUSCULAR; INTRAVENOUS at 12:15

## 2019-08-13 RX ADMIN — FENTANYL CITRATE 25 MCG: 50 INJECTION, SOLUTION INTRAMUSCULAR; INTRAVENOUS at 13:55

## 2019-08-13 RX ADMIN — DEXAMETHASONE SODIUM PHOSPHATE 4 MG: 4 INJECTION, SOLUTION INTRAMUSCULAR; INTRAVENOUS at 12:44

## 2019-08-13 RX ADMIN — PROPOFOL 50 MG: 10 INJECTION, EMULSION INTRAVENOUS at 13:55

## 2019-08-13 RX ADMIN — Medication 80 MCG: at 13:01

## 2019-08-13 RX ADMIN — PROPOFOL 130 MG: 10 INJECTION, EMULSION INTRAVENOUS at 12:33

## 2019-08-13 RX ADMIN — FENTANYL CITRATE 100 MCG: 50 INJECTION, SOLUTION INTRAMUSCULAR; INTRAVENOUS at 12:16

## 2019-08-13 RX ADMIN — PROPOFOL 20 MG: 10 INJECTION, EMULSION INTRAVENOUS at 12:54

## 2019-08-13 RX ADMIN — ONDANSETRON HYDROCHLORIDE 4 MG: 2 INJECTION, SOLUTION INTRAMUSCULAR; INTRAVENOUS at 13:46

## 2019-08-13 RX ADMIN — MIDAZOLAM HYDROCHLORIDE 1 MG: 1 INJECTION, SOLUTION INTRAMUSCULAR; INTRAVENOUS at 12:40

## 2019-08-13 RX ADMIN — PHENYLEPHRINE HYDROCHLORIDE 20 MCG/MIN: 10 INJECTION INTRAVENOUS at 13:01

## 2019-08-13 RX ADMIN — Medication 160 MCG: at 12:43

## 2019-08-13 RX ADMIN — PROPOFOL 75 MCG/KG/MIN: 10 INJECTION, EMULSION INTRAVENOUS at 12:45

## 2019-08-13 RX ADMIN — Medication 120 MCG: at 14:02

## 2019-08-13 RX ADMIN — LIDOCAINE HYDROCHLORIDE 80 MG: 20 INJECTION, SOLUTION EPIDURAL; INFILTRATION; INTRACAUDAL; PERINEURAL at 12:33

## 2019-08-13 RX ADMIN — FENTANYL CITRATE 50 MCG: 50 INJECTION, SOLUTION INTRAMUSCULAR; INTRAVENOUS at 12:33

## 2019-08-13 RX ADMIN — Medication 80 MCG: at 12:52

## 2019-08-13 RX ADMIN — Medication 2 G: at 12:40

## 2019-08-13 NOTE — PERIOP NOTES
Dr. Shaka Lott performed a left popliteal block in pre-op with the U/S machine and the nerve stimulator. Pt was prone for this procedure. Pt received 3mg of versed and 100mcg of fentanyl Iv for sedation. Pt tolerated the block well. She was on cardiac monitoring, pulse oximetry and 3 L NC O2. Pt was awake and alert post block.   She was taken to the OR after the 1st set of VS

## 2019-08-13 NOTE — BRIEF OP NOTE
BRIEF OPERATIVE NOTE    Date of Procedure: 8/13/2019   Preoperative Diagnosis: LEFT HEEL PAIN POSTERIOR/FAILED CONSERVATIVE PREVIOUS TREATMENT  Postoperative Diagnosis: LEFT HEEL PAIN POSTERIOR/FAILED CONSERVATIVE. LEFT INSERTIONAL ACHILLES TENDINOSIS, HAGLUNDS, POSTERIOR BONE SPUR. Procedure(s):  LEFT HAGLUNDS AND  BONE SPUR EXCISION/ DEBRIDEMENT ACHILLES TENDON WITH SECONDARY REPAIR WITH SUTURE ANCHOR (1), INTRAOPERATIVE FLUOROSCOPY USE AND REVIEW (GEN/BLOCK), SHORT LEG SPLINT APPLICATION. Surgeon(s) and Role:     Macario Lopez MD - Primary         Surgical Assistant: Christel Nelson. Surgical Staff:  Circ-1: Jena Casanova RN  Scrub Tech-1: Lisa Singleton  Surg Asst-1: Kylie Masterson  Event Time In Time Out   Incision Start 1259    Incision Close 1408      Anesthesia: General   Estimated Blood Loss: minimal  Specimens: * No specimens in log *   Findings: There is posterior heel spur with Haglunds. Achilles tendon degenerative at distal insertional area anterior aspect. Complications: none. Implants:   Implant Name Type Inv.  Item Serial No.  Lot No. LRB No. Used Action   5.5 x 14.7 corkscrew suture anchor   NA  [id number] Left 1 Implanted

## 2019-08-13 NOTE — DISCHARGE INSTRUCTIONS
1. No weight bearing on LLE, use crutches, knee walker to remain NWB. 2. Keep splint, clean, dry and intact, call if not comfortable, it gets wet or soiled, to arrange time to change. 3. Use pain medications as prescribed, pain will be more significant when the pain block wears off.  4. Start antibiotics tomorrow and finish for 2 days. 5. Take aspirin  81 mg 1 tab po bid x 2 weeks for DVT prophylaxis. 6. Call Rosalba Joel at [phone] to arrange 2 weeks follow up at [facility] office. 7. Call Chrislyndseyrozina Johnson at [phone] with any problems, questions or concerns. DO NOT TAKE TYLENOL/ACETAMINOPHEN WITH PERCOCET, LORTAB, [address] TAKE NARCOTIC PAIN MEDICATIONS WITH FOOD     Narcotics tend to be constipating, we suggest taking a stool softener such as Colace or Miralax (follow package instructions). DO NOT DRIVE WHILE TAKING NARCOTIC PAIN MEDICATIONS. DO NOT TAKE SLEEPING MEDICATIONS OR ANTIANXIETY MEDICATIONS WHILE TAKING NARCOTIC PAIN MEDICATIONS,  ESPECIALLY THE NIGHT OF ANESTHESIA! CPAP PATIENTS BE SURE TO WEAR MACHINE WHENEVER NAPPING OR SLEEPING! DISCHARGE SUMMARY from Nurse    The following personal items collected during your admission are returned to you:   Dental Appliance: Dental Appliances: None  Vision: Visual Aid: Glasses, With patient  Hearing Aid:    Jewelry: Jewelry: None  Clothing: Clothing: With patient, Shirt, Undergarments, Footwear, Pants, Socks  Other Valuables: Other Valuables: Eyeglasses, Cell Phone(phone given to ; glasses placed in pacu)  Valuables sent to safe:        PATIENT INSTRUCTIONS:    After General Anesthesia or Intravenous Sedation, for 24 hours or while taking prescription Narcotics:        Someone should be with you for the next 24 hours. For your own safety, a responsible adult must drive you home. · Limit your activities  · Recommended activity: Rest today, up with assistance today.  Do not climb stairs or shower unattended for the next 24 hours.  · Please start with a soft bland diet and advance as tolerated (no nausea) to regular diet. · If you have a sore throat you should try the following: fluids, warm salt water gargles, or throat lozenges. If it does not improve after several days please follow up with your primary physician. · Do not drive and operate hazardous machinery  · Do not make important personal or business decisions  · Do  not drink alcoholic beverages  · If you have not urinated within 8 hours after discharge, please contact your surgeon on call. Report the following to your surgeon:  · Excessive pain, swelling, redness or odor of or around the surgical area  · Temperature over 100.5  · Nausea and vomiting lasting longer than 4 hours or if unable to take medications  · Any signs of decreased circulation or nerve impairment to extremity: change in color, persistent  numbness, tingling, coldness or increase pain      · You will receive a Post Operative Call from one of the Recovery Room Nurses on the day after your surgery to check on you. It is very important for us to know how you are recovering after your surgery. If you have an issue or need to speak with someone, please call your surgeon, do not wait for the post operative call. · You may receive an e-mail or letter in the mail from [employer] regarding your experience with us in the Ambulatory Surgery Unit. Your feedback is valuable to us and we appreciate your participation in the survey. · If the above instructions are not adequate or you are having problems after your surgery, call the physician at their office number. · We wish you a speedy recovery ? What to do at Home:      *  Please give a list of your current medications to your Primary Care Provider. *  Please update this list whenever your medications are discontinued, doses are      changed, or new medications (including over-the-counter products) are added.     *  Please carry medication information at all times in case of emergency situations. If you have not received your influenza and/or pneumococcal vaccine, please follow up with your primary care physician. The discharge information has been reviewed with the patient and caregiver. The patient and caregiver verbalized understanding. Otis Crockett THROMBOSIS AND PULMONARY EMBOLUS    SURGICAL PATIENTS  Surgical patients are the #1 risk for DVT and PE. WHAT IS DVT? WHAT IS PE?  DVT is a serious condition where blood clots develop deep in the veins of the legs. PE occurs when a blood clot breaks loose from the wall of a vein and travels to the lungs blocking the pulmonary artery or one of its branches impairing blood flow from the heart, which could result in death.   RISK FACTORS   Surgery lasting longer than 45 minutes   History of inflammatory bowel disease   Oral contraceptive or hormone replacement therapy   Immobilization   Varicose veins / swollen legs   Smoking    CHF / Acute MI / Irregular heart beat   Family history of thrombosis   General anesthesia greater than 2 hours   Obesity   Infection of less than one month   Less than 1 month postpartum   COPD / Pneumonia   Arthroscopic surgery   Malignancy / cancer   Spine surgery   Blood abnormalities   Stroke / Paralysis / Coma    SIGNS AND SYMPTOMS OF DEEP VEIN TROMBOSIS   -  ER VISIT  Usually occurs in one leg, above or below the knee   Swelling - one calf or thigh may be larger than the other   Feeling increased warmth in the area of the leg that is swollen or painful   Leg pain, which may increase when standing or walking   Swelling along the vein of the leg   When swollen areas is pressed with a finger, a depression may remain   Tenderness of the leg that may be confined to one area   Change in leg color (bluish or red)    SIGNS AND SYMPTOMS OF PULMONARY EMBOLUS  - 911 CALL   Chest pain that gets worse with deep breath, coughing or chest movement   Coughing up blood   Sweating   Shortness of breath or difficulty breathing   Rapid heart beat   Lightheadedness    HOW TO REDUCE THE POSSIBLE RISK OF DVT   Exercise - simple activities as rotating ankles and wrists, wiggling toes and fingers, tightening and relaxing muscles in calves and thighs promotes circulation while recovering from surgery. Please do these exercises every hour during waking hours   Take mediation as prescribed by your physician (Lovenox, Coumadin, Aspirin)   Resume your normal activities as soon as your doctor advises you to do so.  Remember, when traveling, to [place] your legs frequently. PATIENTS WHO BELIEVE THEY MAY BE EXPERIENCING SIGNS AND SYMPTOMS OF DVT OR PE SHOULD SEEK MEDICAL HELP IMMEDIATELY         TO PREVENT AN INFECTION      1. [address] YOUR HANDS     To prevent infection, good handwashing is the most important thing you or your caregiver can do.  Wash your hands with soap and water or use the hand  we gave you before you touch any wounds. 2. SHOWER     Use the antibacterial soap we gave you when you take a shower.  Shower with this soap until your wounds are healed.  To reach all areas of your body, you may need someone to help you.  Dont forget to clean your belly button with every shower. 3.  USE CLEAN SHEETS     Use freshly cleaned sheets on your bed after surgery.  To keep the surgery site clean, do not allow pets to sleep with you while your wound is still healing. 4. STOP SMOKING     Stop smoking, or at least cut back on smoking     Smoking slows your healing. 5.  CONTROL YOUR BLOOD SUGAR     High blood sugars slow wound healing. If you are diabetic, control your blood sugar levels before and after your surgery.      Patient Education        Learning About How to Use [address] can help you walk when you have an injured hip, leg, knee, ankle, or foot. Your doctor will tell you how much weight--if any--you can put on your leg. Be sure your crutches fit you. When you stand up in your normal posture, there should be space for two or three fingers between the top of the crutch and your armpit. When you let your hands hang down, the hand  should be at your wrists. When you put your hands on the hand , your elbows should be slightly bent. To stay safe when using crutches:  · Look straight ahead, not down at your feet. · Clear away small rugs, cords, or anything else that could cause you to trip, slip, or fall. · Be very careful around pets and small children. They can get in your path when you least expect it. · Be sure the rubber tips on your crutches are clean and in good condition to help prevent slipping. · Avoid slick conditions, such as wet floors and snowy or icy driveways. In bad weather, be especially careful on curbs and steps. How to use crutches  Getting ready to walk    1. Bend your elbows slightly. Press the padded top parts of the crutches against your sides, under your armpits. 2. If you have been told not to put any weight on your injured leg, keep that leg bent and off the ground. How to walk with crutches when you can put weight on the injured leg    1. Put both crutches about 12 inches in front of you. The crutches and your feet should form a triangle. Hold the crutches close enough to your body so you can push straight down on them, but leave room between the crutches for your body to pass through. Don't lean forward to reach farther. 2. Put your weight on the handgrips, not on the pads under your arms. Constant pressure against your underarms can cause numbness. 3. Move your weak or injured leg forward so it's almost even with the crutches. 4. Bring your good leg up, so it's even with your weak or injured leg.   5. Move your crutches about 12 inches in front of you, and start the next step.    Sitting down    1. To sit, back up to the chair. Use one hand to hold both crutches by the handgrips, beside your injured leg. With the other hand, hold onto the seat and slowly lower yourself onto the chair. 2. Lay the crutches on the ground near your chair. If you prop them up, they may fall over. Getting up from a chair    1. To get up from a chair,  the crutches and put them in one hand beside your injured leg. 2. Put your weight on the handgrips of the crutches and on your strong leg to stand up. How to go up and down stairs using crutches    1. Stand near the edge of the stairs. 2. Go up or down the stairs. ? If you are going up, step up with your stronger leg. Then bring the crutches and your weak or injured leg to the upper step. ? If you are going down, put your crutches and your weak or injured leg on the lower step. Then bring your stronger leg down to the lower step. Remember \"up with the good, and down with the bad\" to help you lead with the correct leg. Crutches: How to go up and down stairs with handrails    1. Stand near the edge of the stairs. 2. Put both crutches under the arm opposite the handrail. 3. Use the hand opposite the handrail to hold both crutches by the handgrips. 4. Hold onto the handrail as you go up or down. 5. Go up or down the stairs. ? If you are going up, step up with your stronger leg. Then bring the crutches and your weak or injured leg to the upper step. ? If you are going down, put your crutches and your weak or injured leg on the lower step. Then bring your stronger leg down to the lower step. Remember \"up with the good, down with the bad\" to help you lead with the correct leg. Follow-up care is a key part of your treatment and safety. Be sure to make and go to all appointments, and call your doctor if you are having problems. It's also a good idea to know your test results and keep a list of the medicines you take.   Where can you learn more? Go to http://leonel-sami.info/. Enter L631 in the search box to learn more about \"Learning About How to Use Crutches. \"  Current as of: September 20, 2018  Content Version: 12.1  © 0958-6667 [employer]. Care instructions adapted under license by [employer] (which disclaims liability or warranty for this information). If you have questions about a medical condition or this instruction, always ask your healthcare professional. [place] [address] any warranty or liability for your use of this information.

## 2019-08-13 NOTE — ANESTHESIA PROCEDURE NOTES
Peripheral Block    Start time: 8/13/2019 12:12 PM  End time: 8/13/2019 12:23 PM  Performed by: Brandon Motley MD  Authorized by: Brandon Motley MD       Pre-procedure:    Indications: at surgeon's request and post-op pain management    Preanesthetic Checklist: patient identified, risks and benefits discussed, site marked, timeout performed, anesthesia consent given and patient being monitored    Timeout Time: 12:14          Block Type:   Block Type:  Popliteal  Laterality:  Left  Monitoring:  Standard ASA monitoring, responsive to questions and oxygen  Injection Technique:  Single shot  Procedures: ultrasound guided and nerve stimulator    Patient Position: prone  Prep: chlorhexidine    Location:  Mid thigh  Needle Type:  Stimuplex  Needle Gauge:  21 G  Needle Localization:  Ultrasound guidance and nerve stimulator  Motor Response: minimal motor response >0.4 mA      Assessment:  Number of attempts:  1  Injection Assessment:  Incremental injection every 5 mL, no paresthesia, ultrasound image on chart, local visualized surrounding nerve on ultrasound, negative aspiration for blood and no intravascular symptoms  Patient tolerance:  Patient tolerated the procedure well with no immediate complications

## 2019-08-13 NOTE — ANESTHESIA POSTPROCEDURE EVALUATION
Procedure(s):  LEFT HAGLUNDS PROCEDURE/ BONE SPUR EXCISION/ DEBRIDEMENT ACHILLES TENDON WITH SECONDARY REPAIR (GEN/BLOCK). general, regional    Anesthesia Post Evaluation      Multimodal analgesia: multimodal analgesia used between 6 hours prior to anesthesia start to PACU discharge  Patient location during evaluation: bedside  Patient participation: complete - patient participated  Level of consciousness: awake and alert  Pain score: 0  Airway patency: patent  Anesthetic complications: no  Cardiovascular status: acceptable  Respiratory status: acceptable  Hydration status: acceptable  Comments: Pt has popliteal block. Non-weight bearing postop.   Post anesthesia nausea and vomiting:  none      Vitals Value Taken Time   /87 8/13/2019  2:25 PM   Temp 37.1 °C (98.7 °F) 8/13/2019  2:18 PM   Pulse 100 8/13/2019  2:25 PM   Resp 18 8/13/2019  2:25 PM   SpO2 98 % 8/13/2019  2:25 PM

## 2019-08-13 NOTE — PERIOP NOTES
56 Pt arrived via stretcher into PACU from OR procedure with Dr. Ian Sheldon slightly awake and in no pain. Received report from RN and CRNA.     3917 Permission received to review discharge instructions and discuss private health information with . [address]  back into PACU to sit with wife. 1430 Pt tolerating liquids. 1515 Pt. Alert. Denies pain or chill. Discharge instructions reviewed with caregiver and patient. Allowed and answered any and all questions. Tolerating PO fluids. Both state ready for discharge. Assisted pt with getting dressed.  Confirmed all belongings with patient

## 2019-08-13 NOTE — PERIOP NOTES
Susana Mckee [facility]  1965  [id number]    Situation:  Verbal report given from: JOSUÉ Sumner CRNA; Haven Royal RN  Procedure: Procedure(s):  LEFT HAGLUNDS PROCEDURE/ BONE SPUR EXCISION/ DEBRIDEMENT ACHILLES TENDON WITH SECONDARY REPAIR (GEN/BLOCK)    Background:    Preoperative diagnosis: LEFT HEEL PAIN POSTERIOR/FAILED CONSERVATIVE PREVIOUS TREATMENT    Postoperative diagnosis: LEFT HEEL PAIN POSTERIOR/FAILED CONSERVATIVE PREV    :  Dr. Caron Alexander    Assistant(s): Circ-1: Fracisco Sharpe RN  Scrub Tech-1: Nadeem Oglesby  Surg Asst-1: Amado Danielle    Specimens: * No specimens in log *    Assessment:  Intra-procedure medications         Anesthesia gave intra-procedure sedation and medications, see anesthesia flow sheet     Intravenous fluids: LR@ KVO     Vital signs stable       Recommendation:    Permission to share finding with  : yes

## 2019-08-13 NOTE — ANESTHESIA PREPROCEDURE EVALUATION
Anesthetic History     PONV (motion sickness)          Review of Systems / Medical History  Patient summary reviewed, nursing notes reviewed and pertinent labs reviewed    Pulmonary  Within defined limits                 Neuro/Psych   Within defined limits           Cardiovascular              Hyperlipidemia    Exercise tolerance: >4 METS  Comments: 08/19 EKG= SR, SA, low volts   GI/Hepatic/Renal     GERD: poorly controlled      Liver disease (fatty liver)     Endo/Other        Obesity, arthritis and cancer ( right breast)     Other Findings   Comments:   Chronic low back pain   vertigo         Physical Exam    Airway  Mallampati: I  TM Distance: 4 - 6 cm  Neck ROM: normal range of motion   Mouth opening: Normal     Cardiovascular    Rhythm: regular  Rate: normal         Dental    Dentition: Caps/crowns  Comments:  On molars   Pulmonary  Breath sounds clear to auscultation               Abdominal  GI exam deferred       Other Findings            Anesthetic Plan    ASA: 2  Anesthesia type: general and regional - popliteal fossa block          Induction: Intravenous  Anesthetic plan and risks discussed with: Patient

## 2019-08-14 NOTE — PERIOP NOTES
8/14/19 AT 1618  Spoke with pt for f/u phone call.  Stated \"left foot is still numb from block but able to wiggle toes\" Dr. Mary Kay Sparks notified, will call pt again tomorrow to follow up

## 2019-08-14 NOTE — OP NOTES
[address]  OPERATIVE REPORT    Name:  Bebe Dubin  MR#:  [id number]  :  1965  ACCOUNT #:  [de-identified]  DATE OF SERVICE:  2019      PREOPERATIVE DIAGNOSES:  1. Left Achilles tendinosis insertional.  2.  Left Gila's deformity. 3.  Left posterior heel bone spur. 4.  Left posterior heel pain, failed conservative treatment. POSTOPERATIVE DIAGNOSES:  1. Left Achilles tendinosis insertional.  2.  Left Gila's deformity. 3.  Left posterior heel bone spur. 4.  Left posterior heel pain, failed conservative treatment. PROCEDURE PERFORMED:  1. Left Achilles tendon debridement with secondary repair, using suture anchor. 2.  Left partial exostectomy, calcaneus, excision of Gila's. 3.  Left posterior calcaneus, excision of bone spur. 4.  Intraoperative fluoroscopy use and review. 5.  Left short leg splint application. SURGEON:  Rosalva Walton MD.    ASSISTANT:  Phuong Henao CFA    ANESTHESIA:  General endotracheal regional pain block. IV FLUIDS:  800 mL. COMPLICATIONS:  None. SPECIMENS REMOVED:  None. DRAINS:  None. IMPLANTS USED:  One Arthrex 5.5 biocomposite suture anchor. CULTURES:  None. ESTIMATED BLOOD LOSS:  Less then 5 mL. TOURNIQUET TIME:  57 minutes total time 275 mmHg thigh tourniquet. DISPOSITION:  Stable. INDICATION FOR PROCEDURE:  The patient had persistent left heel pain which has failed conservative treatment, so we obtained an MRI, which confirmed she has insertional Achilles tendinosis mostly at the anterior portion of the tendon, the rest of the tendon appears intact. There is irritation from the bone prominence at the posterior heel, which includes the Gila's as well as the bone spurs. We discuss surgical treatment for this condition. We discussed surgical risks and potential complications, expected outcomes, postoperative limitations, and time needed for recovery.   All questions were answered, no guarantees were made, informed consent obtained. PROCEDURE IN DETAIL:  The patient identified in the preoperative holding area, the left lower extremity is marked as the surgical site. She is administered a regional pain block by Anesthesia staff. She is brought to the operating room, placed supine, and administered general anesthesia and once achieved, a well padded tourniquet applied around the thigh. She is then positioned in prone position with airway well protected, all area were well padded. We prepped and draped in the usual sterile fashion, a time-out was called and documented. We then elevated the leg and exsanguinated with an Esmarch tourniquet, inflated to 275 mmHg thigh tourniquet. A slightly medial incision to the posterior heel was made just to the medial border of the Achilles tendon down to the glabrous skin. We made incision, tendon edges were identified, we then lifted the tendon from the medial aspect of the bone to expose the Gila's as well as the bone spurs inside the tendon. The anterior portion of distal Achilles is debrided to normal appearing tissue and prepared for repair back to calcaneus. The bone spurs were excised using rongeur, sagittal saw, and osteotomes, and in the same manner, the Gila's was excised with the sagittal saw, then osteotomes, and then rasp to smooth down the surfaces. Once it was completed, confirmed by fluoroscopy that this is all flattened out and all looks good on x-ray. We then proceeded to irrigating the area with copious amount of normal saline, infiltrated with bacitracin, inserted the suture anchor in usual fashion, confirmed good position by fluoroscopic, and the final heel x-rays are saved. Once the suture anchor inserted and confirmed in good position by fluoroscopy view obtained, we then proceeded to reconstruct the Achilles tendon, secondary repair of the tendon down back to the bone.   There is satisfactory tension on the tendon and resting foot position is similar to contralateral.  The sutures were tied off and once completed, the area copiously irrigated and then subcutaneously repaired using nonabsorbable suture for the paratenon over the tendon and then the subcutaneous with Monocryl and nylon suture for the skin. Repair completed, the tourniquet deflated, there was excellent capillary refill to the edges of the skin and the foot itself, there are no significant bleeders. Sterile dressings and a well-padded splint are applied. The patient is repositioned to supine position, extubated, found stable, and transferred to recovery room in same stable condition. There were no surgical or anesthetic complications during this procedure and all sponge and needle counts were correct at the end of the procedure.       MD CAROLYN [email or web address]  D:  08/13/2019 14:39  T:  08/13/2019 23:07  JOB #:  [id number]

## 2019-08-15 NOTE — PERIOP NOTES
8/15/19 at [address] [place] [address] with pt to f/u re: block. Pt states \"block wore off about 5 pm yesterday (8/14). Toes on left foot still feel a little tingly but able to move them fine. \" States she \" is feeling pain now and taking pain med to keep pain managable. \"

## 2019-09-23 ENCOUNTER — OFFICE VISIT (OUTPATIENT)
Dept: HEMATOLOGY | Age: 54
End: 2019-09-23

## 2019-09-23 VITALS
TEMPERATURE: 97.7 F | SYSTOLIC BLOOD PRESSURE: 134 MMHG | HEART RATE: 88 BPM | OXYGEN SATURATION: 98 % | DIASTOLIC BLOOD PRESSURE: 94 MMHG

## 2019-09-23 DIAGNOSIS — R74.8 ELEVATED LIVER ENZYMES: Primary | ICD-10-CM

## 2019-09-23 NOTE — PROGRESS NOTES
[address] [place] [address], Madhuri BARRIOS Imagene Eck, MD Rachell Price, PA-C Cyrus Smoke, ACNP-BC     April DENISE Mccullough, AGPCNP-BC   Akash Florentin, FNP-CHERRY Lawson, [facility]       Lyndon Deputa [address]    at [address], [facility] Tana Boland  22. [phone]    FAX: [address] [place] [address] Avenue    at [address], [address] [place] [address],[address]    [address] [facility] [address]: [phone]       Patient Care Team:  Светлана Jackson MD as PCP - General (Internal Medicine)  Abdias Moore MD as Physician (Gynecology)  Margarita Zamora MD as Surgeon (General Surgery)  Ki Ferguson MD as Physician (Radiation Oncology)  Jorge Lei MD as Physician (Hematology and Oncology)  Ann Marie Betts MD as Surgeon (Plastic Surgery)  Cristian Simmons MD (Hepatology)      Problem List  Date Reviewed: 8/13/2019          Codes Class Noted    Prediabetes ICD-10-CM: R73.03  ICD-9-CM: 790.29  1/9/2018        Elevated liver enzymes ICD-10-CM: R74.8  ICD-9-CM: 790.5  1/9/2018        S/P breast reconstruction ICD-10-CM: P46.81  ICD-9-CM: V43.82  4/28/2017        Breast cancer, right ([address] ICD-10-CM: C50.911  ICD-9-CM: 174.9  12/2/2015    Overview Signed 4/28/2017  1:02 PM by Cristian Simmons MD     Lumpectomy, XRT 2016             Weight gain ICD-10-CM: R63.5  ICD-9-CM: 783.1  12/17/2014        H/O total hysterectomy ICD-10-CM: Z90.710  ICD-9-CM: V88.01  6/17/2014        Arthritis ICD-10-CM: M19.90  ICD-9-CM: 716.90  Unknown    Overview Signed 1/14/2014  3:04 PM by Abdias Piety     lower back             Hemorrhoids ICD-10-CM: K64.9  ICD-9-CM: 455.6  1/14/2014              Ganga Le returns to the [address] for management of elevated liver enzymes. The active problem list, all pertinent past medical history, medications, radiologic findings and laboratory findings related to the liver disorder were reviewed with the patient. The patient is a 47 y.o.  female who was first noted to have abnormalities in liver enzymes in 1/2016 at the time of her breast cancer diagnosis. Patient had 1/2016 breast cancer surgery and has been on tamoxifen since 4/2016. Target of 5 years goal.  She is continuing to have hotflashes related to this medication. Serologic evaluation for markers of chronic liver disease were assessed at her initial office visit and are normal/negative. Ultrasound of the liver was performed in 1/2017. The results of the imaging suggested chronic liver disease, ?steatosis. An assessment of liver fibrosis with elastography has been performed. This showed no fibrosis and a CAP score over 250, consistent with steatosis. We plan on repeating this study today. The patient had not started any new medications within 3 months preceeding the elevation in liver chemistries. She was subsequently started on tamoxifen in 4/2016 after the initial finding of elevated enzymes. She has otherwise tolerated this medication well and is planned to continue with administration through 2021. The most recent laboratory studies indicate that the liver transaminases have normalized with weight losses, ALP is normal, tests of hepatic synthetic and metabolic function are normal, and the platelet count is normal.    The patient has no symptoms which could be attributed to the liver disorder. The patient completes all daily activities without any functional limitations. The patient has not experienced fatigue, pain in the right side over the liver.     Of note, since her initial presentation to this office, she had an intentional weight loss of 30# in 8 months, over the last year she has regained ~20# as she has not followed her Mila's diet as strictly and her mobility has been impaired by Achilles' tendon repair. She is hopeful that she will be able to increase her activity over the course of the next several months and resume some of her dietary goals. She has effectively been sidelined with the left heel pain/injury for the last year. Her goal target weight is 145. She has discontinued the use of all alcohol and acetaminophen products since 2017. ALLERGIES  No Known Allergies    MEDICATIONS  Current Outpatient Medications   Medication Sig    fexofenadine (ALLEGRA) 180 mg tablet Take 180 mg by mouth daily as needed for Allergies.  raNITIdine (ZANTAC) 150 mg tablet TAKE 1 TABLET BY MOUTH TWICE A DAY    cholecalciferol (VITAMIN D3) 1,000 unit cap Take 1,000 Units by mouth daily.  tamoxifen (NOLVADEX) 20 mg tablet Take 20 mg by mouth daily.  diclofenac (VOLTAREN) 1 % topical gel Apply 4 g to affected area four (4) times daily as needed. No current facility-administered medications for this visit. SYSTEM REVIEW NOT RELATED TO LIVER DISEASE OR REVIEWED ABOVE:  Constitution systems: Negative for fever, chills. Weight gain recently. Eyes: Negative for visual changes. ENT: Negative for sore throat, painful swallowing. Respiratory: Negative for cough, hemoptysis, SOB. Cardiology: Negative for chest pain, palpitations. GI:  Negative for constipation or diarrhea. : Negative for urinary frequency, dysuria, hematuria, nocturia. Skin: Negative for rash. Hematology: Negative for easy bruising, blood clots. Musculo-skeletal: Negative for back pain, muscle pain, weakness. Neurologic: Negative for headaches, dizziness, vertigo, memory problems not related to HE. Psychology: Negative for anxiety, depression. FAMILY HISTORY:  The father  of MI. The mother  of DM, heart disease. There is no family history of liver disease. SOCIAL HISTORY:  The patient is .     The patient has 3 children. The patient has never used tobacco products. The patient has never consumed significant amounts of alcohol. The patient currently works full time as a CNA. PHYSICAL EXAMINATION:  BP (!) 134/94 (BP 1 Location: Left arm, BP Patient Position: Sitting)   Pulse 88   Temp 97.7 °F (36.5 °C) (Tympanic)   LMP 04/11/2014   SpO2 98%   General: No acute distress. Eyes: Sclera anicteric. ENT: No oral lesions. Thyroid normal.  Nodes: No adenopathy. Skin: No spider angiomata. No jaundice. No palmar erythema. Respiratory: Lungs clear to auscultation. Cardiovascular: Regular heart rate. No murmurs. No JVD. Abdomen: Soft non-tender. Liver size normal to percussion/palpation. Spleen not palpable. No obvious ascites. Extremities: No edema. No muscle wasting. No gross arthritic changes. Left LE in aircast boot. Neurologic: Alert and oriented. Cranial nerves grossly intact. No asterixis.     LABORATORY STUDIES:  From 7/2019  AST/ALT/ALP/T Bili/ALB: 70/109/67/0.4/4.6  WBC/HB/PLT/INR:4.5/14.1/267  BUN/CREAT:11/0.62    [address] Ref Rng & Units 8/12/2019 2/22/2019   WBC 3.6 - 11.0 K/uL 4.4    ANC 1.8 - 8.0 K/UL 2.4    HGB 11.5 - 16.0 g/dL 14.6     - 400 K/uL 236    AST 0 - 40 IU/L  39   ALT 0 - 32 IU/L  57 (H)   Alk Phos 39 - 117 IU/L  77   Bili, Total 0.0 - 1.2 mg/dL  0.3   Bili, Direct 0.00 - 0.40 mg/dL  0.11   Albumin 3.5 - 5.5 g/dL  4.6   BUN 6 - 20 MG/DL 11    Creat 0.55 - 1.02 MG/DL 0.67    Na 136 - 145 mmol/L 139    K 3.5 - 5.1 mmol/L 4.1    Cl 97 - 108 mmol/L 105    CO2 21 - 32 mmol/L 26    Glucose 65 - 100 mg/dL 111 (H)      L[facility] [place] [address] Ref Rng & Units 1/18/2019   WBC 3.6 - 11.0 K/uL    ANC 1.8 - 8.0 K/UL    HGB 11.5 - 16.0 g/dL     - 400 K/uL    AST 0 - 40 IU/L 32   ALT 0 - 32 IU/L 47 (H)   Alk Phos 39 - 117 IU/L 72   Bili, Total 0.0 - 1.2 mg/dL 0.4   Bili, Direct 0.00 - 0.40 mg/dL    Albumin 3.5 - 5.5 g/dL 4.8   BUN 6 - 20 MG/DL 14   Creat 0.55 - 1.02 MG/DL 0.60   Na 136 - 145 mmol/L 144   K 3.5 - 5.1 mmol/L 5.0   Cl 97 - 108 mmol/L 104   CO2 21 - 32 mmol/L 22   Glucose 65 - 100 mg/dL 109 (H)     From 5/2018  AST/ALT/ALP/T Bili/ALB: 23/19/78/0.3/4.6  WBC/HB/PLT/INR:4.6/14.2/254  BUN/CREAT:11/0.60    SEROLOGIES:  Serologies Latest Ref Rng & Units 4/28/2017   Hep A Ab, Total Negative Positive (A)   Hep B Surface Ag Negative Negative   Hep B Core Ab, Total Negative Negative   Hep B Surface AB QL  Non Reactive   Hep C Ab 0.0 - 0.9 s/co ratio <0.1   Ferritin 15 - 150 ng/mL 192 (H)   Iron % Saturation 15 - 55 % 25   TAHIRA, IFA  Negative   ASMCA 0 - 19 Units 13   Ceruloplasmin 19.0 - 39.0 mg/dL 42.3 (H)   Alpha-1 antitrypsin level 90 - 200 mg/dL 182     LIVER HISTOLOGY:  1/2018. FibroScan performed at [address]. EkPa was 3.7. Suggested fibrosis level is F0-1. CAP score of 280, this is consistent with steatosis. 9/2019. FibroScan performed at [address]. EkPa was 5.3. Suggested fibrosis level is F0-1. CAP score of 306, this is consistent with steatosis. ENDOSCOPIC PROCEDURES:  Not available or performed    RADIOLOGY:  1/2017. Ultrasound of liver. Echogenic consistent with chronic liver disease. No liver mass lesions. No dilated bile ducts. No ascites. OTHER TESTING:  Not available or performed    ASSESSMENT AND PLAN:  NAFLD. Persistent elevation in liver transaminases, likely due to fatty infiltration of the liver. Liver function is normal.  The platelet count is normal.      Serologic testing to help define the cause of the laboratory abnormality were ordered at her last office visit and were negative. The most likely causes for the liver chemistry abnormalities were discussed with the patient and include fatty liver disease. This likely pre-dated the use of tamoxifen by her history, there may be some ongoing steatosis associated with use of this drug.   She will be on tamoxifen for the next 2 years. l have reviewed laboratory testing done recently through her oncology office. While these are elevated, the liver function remains within normal ranges. I have reviewed this in detail with the patient and have reiterated our goal with regard to weigh losses. We have seen that her liver enzymes have normalized with her past weight reduction. She will continue these efforts and we will plan to repeat her evaluation in 12 months with repeat fibroscan at that time. The patient will notify me if her routine labs show any changes in her study enzymes prior to this planned return. The patient was directed to continue all current medications at the current dosages. There are no contraindications for the patient to take any medications that are necessary for treatment of other medical issues. The patient was counseled regarding alcohol consumption. Vaccination for viral hepatitis B is recommended since the patient has no serologic evidence of previous exposure or vaccination with immunity. She has had prior administration, but as she is in home health care, would recommend a second course of HBV vaccine to be administered. Vaccination for viral hepatitis A is not needed. The patient has serologic evidence of prior exposure or vaccination with immunity. All of the above issues were discussed with the patient. All questions were answered. The patient expressed a clear understanding of the above. [facility] in 12 months with repeat fibroscan at that time.      Svetlana Damian PA-C  [facility] [place] [facility] [address] [facility], [address] Tana Boland  22.  [phone]

## 2019-09-23 NOTE — PROGRESS NOTES
Chief Complaint   Patient presents with    Other     Fibroscan     1. Have you been to the ER, urgent care clinic since your last visit? Hospitalized since your last visit? No    2. Have you seen or consulted any other health care providers outside of the [address] [place] [address] since your last visit? Include any pap smears or colon screening. No    3 most recent PHQ Screens 9/23/2019   Little interest or pleasure in doing things Not at all   Feeling down, depressed, irritable, or hopeless Not at all   Total Score PHQ 2 0     Abuse Screening Questionnaire 9/23/2019   Do you ever feel afraid of your partner? N   Are you in a relationship with someone who physically or mentally threatens you? N   Is it safe for you to go home?  Y     Learning Assessment 9/23/2019   PRIMARY LEARNER Patient   BARRIERS PRIMARY LEARNER NONE   CO-LEARNER CAREGIVER No   PRIMARY LANGUAGE ENGLISH   LEARNER PREFERENCE PRIMARY DEMONSTRATION   ANSWERED BY patient   RELATIONSHIP SELF     Visit Vitals  BP (!) 134/94 (BP 1 Location: Left arm, BP Patient Position: Sitting)   Pulse 88   Temp 97.7 °F (36.5 °C) (Tympanic)   SpO2 98%

## 2019-09-27 ENCOUNTER — HOSPITAL ENCOUNTER (OUTPATIENT)
Dept: MAMMOGRAPHY | Age: 54
Discharge: HOME | End: 2019-09-27
Attending: RADIOLOGY
Payer: COMMERCIAL

## 2019-09-27 DIAGNOSIS — Z85.3 PERSONAL HISTORY OF MALIGNANT NEOPLASM OF BREAST: ICD-10-CM

## 2019-09-27 PROCEDURE — 77066 DX MAMMO INCL CAD BI: CPT

## 2019-10-02 ENCOUNTER — TELEPHONE (OUTPATIENT)
Dept: INTERNAL MEDICINE CLINIC | Age: 54
End: 2019-10-02

## 2019-10-02 RX ORDER — FAMOTIDINE 20 MG/1
20 TABLET, FILM COATED ORAL 2 TIMES DAILY
Qty: 60 TAB | Refills: 1 | Status: SHIPPED | OUTPATIENT
Start: 2019-10-02 | End: 2019-10-26 | Stop reason: SDUPTHER

## 2019-10-02 NOTE — TELEPHONE ENCOUNTER
#[phone] pt would like to discuss the Ranitidine 150 mg and if she should continue or find an alternate as she has heard much about this on the news.   Please call

## 2019-10-02 NOTE — TELEPHONE ENCOUNTER
Called, spoke to pt. Two pt identifiers confirmed. Pt informed per Dr. Jimmie Khanna Sending in pepcid 20mg rx to local pharmacy. Pt states if insurance does not cover it then she will get the generic otc famotadine. Pt verbalized understanding of information discussed w/ no further questions at this time.

## 2019-10-27 RX ORDER — FAMOTIDINE 20 MG/1
TABLET, FILM COATED ORAL
Qty: 60 TAB | Refills: 1 | Status: SHIPPED | OUTPATIENT
Start: 2019-10-27 | End: 2019-11-19 | Stop reason: SDUPTHER

## 2019-11-19 RX ORDER — FAMOTIDINE 20 MG/1
TABLET, FILM COATED ORAL
Qty: 60 TAB | Refills: 1 | Status: SHIPPED | OUTPATIENT
Start: 2019-11-19 | End: 2019-12-17 | Stop reason: SDUPTHER

## 2019-12-17 RX ORDER — FAMOTIDINE 20 MG/1
TABLET, FILM COATED ORAL
Qty: 60 TAB | Refills: 1 | Status: SHIPPED | OUTPATIENT
Start: 2019-12-17 | End: 2020-01-02 | Stop reason: SDUPTHER

## 2019-12-17 NOTE — TELEPHONE ENCOUNTER
PCP: Erendira Jfef MD    Last appt: 1/18/2019  Future Appointments   Date Time Provider Sam Everett   1/24/2020 10:30 AM Erendira Jeff MD [facility]       Requested Prescriptions     Pending Prescriptions Disp Refills    famotidine (PEPCID) 20 mg tablet 60 Tab 1     Sig: TAKE 1 TABLET BY MOUTH TWICE A DAY

## 2020-01-02 RX ORDER — FAMOTIDINE 20 MG/1
TABLET, FILM COATED ORAL
Qty: 180 TAB | Refills: 0 | Status: SHIPPED | OUTPATIENT
Start: 2020-01-02

## 2020-01-02 NOTE — TELEPHONE ENCOUNTER
PCP: Aylin Escobar MD    Last appt: 1/18/2019  Future Appointments   Date Time Provider Sam Everett   1/24/2020 10:30 AM Aylin Escobar MD [facility]       Requested Prescriptions     Pending Prescriptions Disp Refills    famotidine (PEPCID) 20 mg tablet 180 Tab 0     Sig: TAKE 1 TABLET BY MOUTH TWICE A DAY

## 2020-01-24 ENCOUNTER — OFFICE VISIT (OUTPATIENT)
Dept: INTERNAL MEDICINE CLINIC | Age: 55
End: 2020-01-24

## 2020-01-24 VITALS
HEART RATE: 79 BPM | TEMPERATURE: 98 F | DIASTOLIC BLOOD PRESSURE: 84 MMHG | HEIGHT: 62 IN | RESPIRATION RATE: 16 BRPM | BODY MASS INDEX: 32.76 KG/M2 | WEIGHT: 178 LBS | SYSTOLIC BLOOD PRESSURE: 124 MMHG

## 2020-01-24 DIAGNOSIS — R73.03 PREDIABETES: ICD-10-CM

## 2020-01-24 DIAGNOSIS — Z00.00 PHYSICAL EXAM, ANNUAL: Primary | ICD-10-CM

## 2020-01-24 DIAGNOSIS — K21.9 GASTROESOPHAGEAL REFLUX DISEASE WITHOUT ESOPHAGITIS: ICD-10-CM

## 2020-01-24 DIAGNOSIS — H00.011 HORDEOLUM EXTERNUM OF RIGHT UPPER EYELID: ICD-10-CM

## 2020-01-24 DIAGNOSIS — R74.8 ELEVATED LIVER ENZYMES: ICD-10-CM

## 2020-01-24 DIAGNOSIS — Z17.0 MALIGNANT NEOPLASM OF RIGHT BREAST IN FEMALE, ESTROGEN RECEPTOR POSITIVE, UNSPECIFIED SITE OF BREAST (HCC): ICD-10-CM

## 2020-01-24 DIAGNOSIS — C50.911 MALIGNANT NEOPLASM OF RIGHT BREAST IN FEMALE, ESTROGEN RECEPTOR POSITIVE, UNSPECIFIED SITE OF BREAST (HCC): ICD-10-CM

## 2020-01-24 RX ORDER — PANTOPRAZOLE SODIUM 20 MG/1
20 TABLET, DELAYED RELEASE ORAL DAILY
Qty: 30 TAB | Refills: 1 | Status: SHIPPED | OUTPATIENT
Start: 2020-01-24 | End: 2020-04-27

## 2020-01-24 NOTE — PROGRESS NOTES
HISTORY OF PRESENT ILLNESS  Jacqui Orourke is a 47 y.o. female. HPI   Last here 1/18/19 Pt is here for routine care.     BP is 124/84      Wt today is 178 lbs-- up 12 lbs x lov   Discussed diet and w/l  Pt states she has been inactive due to surg   Discussed counting calories   Was prev doing atkins diet but this was too restrictive      Reviewed labs 8/19   Will get labs today   Pt is immune to chicken pox      Pt follows with Dr. Sandrine Azar (hem/onc) for h/o breast cancer  Pt had a lumpectomy in 1/16  Last visit was 6/19   Will f/u 1/20   Pt also sees Dr Lovely Hoskins (rad onc)  Pt follows with Dr. Pedro Joshua (plastics)   Pt had a breast reconstruction in 2/16  Continues on tamoxifen 20mg daily      Pt follows with Dr. Caesar Calderon (hepato) for liver test abnormalities  Reviewed notes 9/23/19: Persistent elevation in liver transaminases, likely due to fatty infiltration of the liver. Liver function is normal.  The platelet count is normal.    Serologic testing to help define the cause of the laboratory abnormality were ordered at her last office visit and were negative. The most likely causes for the liver chemistry abnormalities were discussed with the patient and include fatty liver disease. This likely pre-dated the use of tamoxifen by her history, there may be some ongoing steatosis associated with use of this drug. She will be on tamoxifen for the next 2 years. l have reviewed laboratory testing done recently through her oncology office. While these are elevated, the liver function remains within normal ranges. I have reviewed this in detail with the patient and have reiterated our goal with regard to weigh losses. We have seen that her liver enzymes have normalized with her past weight reduction. She will continue these efforts and we will plan to repeat her evaluation in 12 months with repeat fibroscan at that time.   The patient will notify me if her routine labs show any changes in her study enzymes prior to this planned return. The patient was directed to continue all current medications at the current dosages. There are no contraindications for the patient to take any medications that are necessary for treatment of other medical issues. The patient was counseled regarding alcohol consumption. Vaccination for viral hepatitis B is recommended since the patient has no serologic evidence of previous exposure or vaccination with immunity. She has had prior administration, but as she is in home health care, would recommend a second course of HBV vaccine to be administered. Vaccination for viral hepatitis A is not needed.   The patient has serologic evidence of prior exposure or vaccination with immunity.   will f/u annually     Pt had a [facility] Procedure with Dr Donald Majano (ortho) on 8/13/19   She has a f/u on 2/11/20 for f/u        Pt now using pepcid for gerd   She states she is still having some indigestion   lov gave short course of protonix   Discussed again to get egd/colo   Provided info for GI     Pt has chronic back pain  Pt has used tramadol in the past--not currently, tried one recently and it made her feel funny.      Pt used to take allegra and then, zyrtec    Pt c/o recurrent styes   Discussed therapearl eye mask      Reviewed mammo 9/19-- negative        PREVENTIVE:  Colonoscopy: 2013, Dr. Sonya Stevens in [facility], due now, advised to schedule, reminded   Pap:  [address] [place], 4/28/17, complete hysterectomy   Mammogram: 9/19, negative   Dexa: not yet needed  Tdap: 01/09/18   Pneumovax: not yet needed  Shingrix: not yet needed  Shingrix: discussed  01/19  Flu shot: Fall 2019   A1C: 1/18 5.7, 8/18 5.7 8/19 6.0   Eye exam: Dr. Dlemis Colby, 2016, every 2 years, due, reminded  Lipids: 1/19 LDL  104   Chicken pox-- immune 2018     Patient Active Problem List    Diagnosis Date Noted    Prediabetes 01/09/2018    Elevated liver enzymes 01/09/2018    S/P breast reconstruction 04/28/2017    Breast cancer, right ([address] 12/02/2015  Weight gain 12/17/2014    H/O total hysterectomy 06/17/2014    Hemorrhoids 01/14/2014    Arthritis      Current Outpatient Medications   Medication Sig Dispense Refill    famotidine (PEPCID) 20 mg tablet TAKE 1 TABLET BY MOUTH TWICE A  Tab 0    fexofenadine (ALLEGRA) 180 mg tablet Take 180 mg by mouth daily as needed for Allergies.  cholecalciferol (VITAMIN D3) 1,000 unit cap Take 1,000 Units by mouth daily.  tamoxifen (NOLVADEX) 20 mg tablet Take 20 mg by mouth daily.  diclofenac (VOLTAREN) 1 % topical gel Apply 4 g to affected area four (4) times daily as needed.        Past Surgical History:   Procedure Laterality Date    HX BREAST LUMPECTOMY Right 1/25/2016    RIGHT CENTRAL LUMPECTOMY (TYLECTOMY) & SENTINEL LYMPH NODE BIOPSY performed by Otilia Oquendo MD at [facility] OR    HX BREAST RECONSTRUCTION Right 2/10/2017    RIGHT BREAST RECONSTRUCTION WITH BREAST IMPLANT, LEFT BREAST AUGMENTATION AND MASTOPEXY performed by Samira Joaquin MD at hospitals MA[facility] OR    HX BREAST REDUCTION Left 2/10/2017    BREAST MASTOPEXY performed by Samira Joaquin MD at [address] [place] [address]    ectopic preg and ovarian cystectomy    HX HYSTERECTOMY  2014    HX MASTOPEXY (BREAST LIFT) Left 02/10/2017    HX ORTHOPAEDIC Bilateral 2008    carpel tunnel release both wrists 2 weeks apart    IMPLANT BREAST SILICONE/EQ Bilateral 58/66/2688      Lab Results   Component Value Date/Time    WBC 4.4 08/12/2019 09:22 AM    HGB 14.6 08/12/2019 09:22 AM    HCT 43.8 08/12/2019 09:22 AM    PLATELET 934 29/00/0009 09:22 AM    MCV 89.8 08/12/2019 09:22 AM     Lab Results   Component Value Date/Time    Cholesterol, total 191 01/18/2019 10:24 AM    HDL Cholesterol 71 01/18/2019 10:24 AM    LDL, calculated 104 (H) 01/18/2019 10:24 AM    Triglyceride 81 01/18/2019 10:24 AM     Lab Results   Component Value Date/Time    GFR est non-AA >60 08/12/2019 09:22 AM    GFR est AA >60 08/12/2019 09:22 AM    Creatinine 0.67 08/12/2019 09:22 AM    BUN 11 08/12/2019 09:22 AM    Sodium 139 08/12/2019 09:22 AM    Potassium 4.1 08/12/2019 09:22 AM    Chloride 105 08/12/2019 09:22 AM    CO2 26 08/12/2019 09:22 AM        Review of Systems   Respiratory: Negative for shortness of breath. Cardiovascular: Negative for chest pain. Physical Exam  Constitutional:       General: She is not in acute distress. Appearance: She is well-developed. She is not diaphoretic. HENT:      Head: Normocephalic and atraumatic. Right Ear: Tympanic membrane, ear canal and external ear normal.      Left Ear: Tympanic membrane, ear canal and external ear normal.      Mouth/Throat:      Mouth: Mucous membranes are moist.      Pharynx: Oropharynx is clear. No oropharyngeal exudate or posterior oropharyngeal erythema. Eyes:      General:         Right eye: No discharge. Left eye: No discharge. Conjunctiva/sclera: Conjunctivae normal.      Comments: Stye on upper eye lid      Neck:      Musculoskeletal: Normal range of motion and neck supple. Cardiovascular:      Rate and Rhythm: Normal rate and regular rhythm. Heart sounds: Normal heart sounds. No murmur. No friction rub. No gallop. Pulmonary:      Effort: Pulmonary effort is normal. No respiratory distress. Breath sounds: Normal breath sounds. No wheezing or rales. Chest:      Chest wall: No tenderness. Abdominal:      General: Bowel sounds are normal. There is no distension. Palpations: There is no mass. Tenderness: There is no tenderness. Hernia: No hernia is present. Musculoskeletal: Normal range of motion. General: No tenderness or deformity. Lymphadenopathy:      Cervical: No cervical adenopathy. Skin:     General: Skin is warm and dry. Coloration: Skin is not pale. Findings: No erythema or rash. Neurological:      Mental Status: She is alert and oriented to person, place, and time.       Coordination: Coordination normal.   Psychiatric:         Mood and Affect: Mood normal.         Behavior: Behavior normal.         ASSESSMENT and PLAN    ICD-10-CM ICD-9-CM    1. Physical exam, annual                  Discussed diet wt loss ww needs to focus on this flu shot utd due for pelvic colo and eye mammo utd discussed shingrix closer to age 61  Z00.00 V70.0 REFERRAL TO OBSTETRICS AND GYNECOLOGY      LIPID PANEL      METABOLIC PANEL, COMPREHENSIVE      CBC W/O DIFF      TSH 3RD GENERATION      HEMOGLOBIN A1C WITH EAG      REFERRAL TO GASTROENTEROLOGY   2. Gastroesophageal reflux disease without esophagitis                  Pt has persistent sxs currently taking pepcid will give another trial of protonix she is overdue for colo would benefit from egd at the same time  K21.9 530.81 REFERRAL TO OBSTETRICS AND GYNECOLOGY      LIPID PANEL      METABOLIC PANEL, COMPREHENSIVE      CBC W/O DIFF      TSH 3RD GENERATION      HEMOGLOBIN A1C WITH EAG      REFERRAL TO GASTROENTEROLOGY   3. Elevated liver enzymes              Follows with dr Eliza [facility] from fatty liver needs to work on wt loss  R74.8 790.5    4. Malignant neoplasm of right breast in female, estrogen receptor positive, unspecified site of breast ([facility] [address]                  Follows with dr Brenda Kline will see her next month in remission mammo utd  C50.911 174.9     Z17.0 V86.0    5. Prediabetes              Check a1c  R73.03 790.29    6. Hordeolum externum of right upper eyelid            Discussed warm compresses specifically microwave eye mask  H00.011 373.11       Depression screen reviewed and negative. Scribed by Promise Garcia of [facility], as dictated by Dr. Selam Corcoran. Current diagnosis and concerns discussed with pt at length. Pt understands risks and benefits or current treatment plan and medications, and accepts the treatment and medication with any possible risks. Pt asks appropriate questions, which were answered.  Pt was instructed to call with any concerns or problems. I have reviewed the note documented by the scribe. The services provided are my own.   The documentation is accurate

## 2020-01-25 LAB
ALBUMIN SERPL-MCNC: 4.9 G/DL (ref 3.8–4.9)
ALBUMIN/GLOB SERPL: 2.3 {RATIO} (ref 1.2–2.2)
ALP SERPL-CCNC: 103 IU/L (ref 39–117)
ALT SERPL-CCNC: 227 IU/L (ref 0–32)
AST SERPL-CCNC: 140 IU/L (ref 0–40)
BILIRUB SERPL-MCNC: 0.3 MG/DL (ref 0–1.2)
BUN SERPL-MCNC: 8 MG/DL (ref 6–24)
BUN/CREAT SERPL: 13 (ref 9–23)
CALCIUM SERPL-MCNC: 9.6 MG/DL (ref 8.7–10.2)
CHLORIDE SERPL-SCNC: 101 MMOL/L (ref 96–106)
CHOLEST SERPL-MCNC: 183 MG/DL (ref 100–199)
CO2 SERPL-SCNC: 22 MMOL/L (ref 20–29)
CREAT SERPL-MCNC: 0.61 MG/DL (ref 0.57–1)
ERYTHROCYTE [DISTWIDTH] IN BLOOD BY AUTOMATED COUNT: 13.1 % (ref 11.7–15.4)
EST. AVERAGE GLUCOSE BLD GHB EST-MCNC: 140 MG/DL
GLOBULIN SER CALC-MCNC: 2.1 G/DL (ref 1.5–4.5)
GLUCOSE SERPL-MCNC: 98 MG/DL (ref 65–99)
HBA1C MFR BLD: 6.5 % (ref 4.8–5.6)
HCT VFR BLD AUTO: 43.9 % (ref 34–46.6)
HDLC SERPL-MCNC: 66 MG/DL
HGB BLD-MCNC: 14.5 G/DL (ref 11.1–15.9)
LDLC SERPL CALC-MCNC: 95 MG/DL (ref 0–99)
MCH RBC QN AUTO: 29.9 PG (ref 26.6–33)
MCHC RBC AUTO-ENTMCNC: 33 G/DL (ref 31.5–35.7)
MCV RBC AUTO: 91 FL (ref 79–97)
PLATELET # BLD AUTO: 235 X10E3/UL (ref 150–450)
POTASSIUM SERPL-SCNC: 4.6 MMOL/L (ref 3.5–5.2)
PROT SERPL-MCNC: 7 G/DL (ref 6–8.5)
RBC # BLD AUTO: 4.85 X10E6/UL (ref 3.77–5.28)
SODIUM SERPL-SCNC: 146 MMOL/L (ref 134–144)
TRIGL SERPL-MCNC: 110 MG/DL (ref 0–149)
TSH SERPL DL<=0.005 MIU/L-ACNC: 2.41 UIU/ML (ref 0.45–4.5)
VLDLC SERPL CALC-MCNC: 22 MG/DL (ref 5–40)
WBC # BLD AUTO: 5.3 X10E3/UL (ref 3.4–10.8)

## 2020-01-26 NOTE — PROGRESS NOTES
Labs much worse with wt gain  a1c now in diabetic range--needs tos start monitoring glucose fasting, no meds just yet  Work on wt loss, avoids \"whites\"  rtc 3 months to review readings and determine if meds needed  Liver test MUCH more elevated than last check  Wt loss, repeat hepatic fxn panel in 4 weeks     Schedule 3 month f/u

## 2020-01-29 DIAGNOSIS — R79.89 LFT ELEVATION: ICD-10-CM

## 2020-01-29 DIAGNOSIS — E11.8 CONTROLLED TYPE 2 DIABETES MELLITUS WITH COMPLICATION, WITHOUT LONG-TERM CURRENT USE OF INSULIN (HCC): Primary | ICD-10-CM

## 2020-01-29 RX ORDER — INSULIN PUMP SYRINGE, 3 ML
EACH MISCELLANEOUS
Qty: 1 KIT | Refills: 0 | Status: SHIPPED | OUTPATIENT
Start: 2020-01-29

## 2020-01-29 RX ORDER — LANCETS
EACH MISCELLANEOUS
Qty: 1 EACH | Refills: 11 | Status: SHIPPED | OUTPATIENT
Start: 2020-01-29

## 2020-01-29 NOTE — PROGRESS NOTES
Called, spoke to pt. Two pt identifiers confirmed. Pt informed per Dr. Shirin Lainez labs much worse with wt gain. Pt informed per Dr. Dequan Murray now in diabetic range--needs tos start monitoring glucose fasting, no meds just yet--pended glucometer/supplies. Advised pt to callback to schedule glucometer teaching. Pt informed per Dr. Shirin Lainez work on wt loss and diet-avoids \"whites\"/carbs. Pt informed per Dr. Shirin Lainez rtc 3 months to review readings and determine if meds needed. Pt informed per Dr. Shirin Lainez Liver test [facility] more elevated than last check-work on wt loss and  repeat hepatic fxn panel in 4 weeks--labs ordered. Pt states she will callback to schedule 3mo for she is at work currently. Pt verbalized understanding of information discussed w/ no further questions at this time.

## 2020-02-28 DIAGNOSIS — R79.89 LFT ELEVATION: ICD-10-CM

## 2020-02-29 LAB
ALBUMIN SERPL-MCNC: 4.8 G/DL (ref 3.8–4.9)
ALP SERPL-CCNC: 88 IU/L (ref 39–117)
ALT SERPL-CCNC: 131 IU/L (ref 0–32)
AST SERPL-CCNC: 66 IU/L (ref 0–40)
BILIRUB DIRECT SERPL-MCNC: 0.15 MG/DL (ref 0–0.4)
BILIRUB SERPL-MCNC: 0.4 MG/DL (ref 0–1.2)
PROT SERPL-MCNC: 7.2 G/DL (ref 6–8.5)

## 2020-03-02 NOTE — PROGRESS NOTES
Please call patient back about results  lft remain elevated though slightly better than last month  Please schedule f/u with liver clinic (follows [facility] clinic)--routed copy of lft to [address] [place] [address] as fyi    Will see pt as scheduled in April to discuss sugars/diabetes

## 2020-03-04 ENCOUNTER — PATIENT MESSAGE (OUTPATIENT)
Dept: INTERNAL MEDICINE CLINIC | Age: 55
End: 2020-03-04

## 2020-03-04 ENCOUNTER — TELEPHONE (OUTPATIENT)
Dept: INTERNAL MEDICINE CLINIC | Age: 55
End: 2020-03-04

## 2020-03-04 NOTE — TELEPHONE ENCOUNTER
Called, spoke to pt. Two pt identifiers confirmed. Pt informed of lab results. See recent lab result encounter. Pt verbalized understanding of information discussed w/ no further questions at this time.

## 2020-04-09 ENCOUNTER — TELEPHONE (OUTPATIENT)
Dept: INTERNAL MEDICINE CLINIC | Age: 55
End: 2020-04-09

## 2020-04-09 NOTE — TELEPHONE ENCOUNTER
[place] [facility] [place]   Phone Number: [phone]             General Message/Vendor Calls     Caller's first and last name:Brenda Daigle       Reason for call:pt wants to know if she should come in for blood work to check her A1c and liver enzymes. And wants to know if she should still check her sugar in the morning. Please advise pt.        Callback required yes/no and why:yes       Best contact number(s):[phone]       Details to clarify the request:       [address] [facility]

## 2020-04-09 NOTE — TELEPHONE ENCOUNTER
Name and  confirmed. Pt called office and seeing if she needed lab work prior to 3 month follow up, or should she wait until the summer to have the labs drawn. Pt stated that she lost 13 lbs and her liver enzyme was good last time it was checked. Pt wants to know if she should continue testing her DM range every morning since it is running in the low 100's.

## 2020-04-15 NOTE — TELEPHONE ENCOUNTER
She should attend [facility] as schedule d  on 4/27 and will review glucose numbers then and will advise on lab draw at that time

## 2020-04-15 NOTE — TELEPHONE ENCOUNTER
Name and  confirmed. Called pt and informed her of the message pertaining to her labs and office visit, pt verbally understood.

## 2020-04-26 NOTE — PROGRESS NOTES
HISTORY OF PRESENT ILLNESS  Lisa Shipley is a 54 y.o. female. HPI   Last here 1/24/20 Pt is here for routine care. This is an established visit completed with telemedicine was completed with video assist  the patient acknowledges and agrees to this method of visitation       BP was  124/84   Generally well controlled  She is not monitoring much at home  Last check was 119/78      Wt was 178 lbs-- lov she states down to 159lb at home  Her labs came back in dm range so she started working more aggressively on wt loss  She has restricted her calories a lot  Counts them  Eating 900-1100 calories daily   Cut out all bread and pasta, rarely will have sweet potato no reg potaotes  More veg, low carb diet        Reviewed labs  Pt is immune to chicken pox     She has been checking her sugars, her average is 114.  Range is 141 at the highest  They were in the 120's originally, now more int eh 114 range  Lowest was 88  This made her feel shakey       Pt follows with Dr. Mikhail Kang (hem/onc) for h/o breast cancer  Pt had a lumpectomy in 1/16  Last visit was 1/20 --all was fine at that ov  Reviewed notes discussed all good, f/u 6 months no change      Pt also sees Dr Juan Deleon (rad onc)  Pt follows with Dr. Keely Espinal (plastics)   Pt had a breast reconstruction in 2/16  Continues on tamoxifen 20mg daily will take this for 5 years.     Pt follows with Dr. John Olmos (hepato) for liver test abnormalities  Last there  9/23/19: from fatty liver  lft were higher last check w wt gain        Pt had a [facility] Procedure with Dr Otf Holloway (ortho) on 8/13/19   She has a f/u on 2/11/20 for f/u          Pt now using pepcid for gerd   Her gerd has improved w wt loss  She had been using pepcid at night and taking protonix in the am   Discussed every other day dosing      Pt has chronic back pain  Pt has used tramadol in the past--not currently, tried one recently and it made her feel funny.      Pt used to take allegra and then, zyrtec     Saw dr Homero [place] and was to get colo tomorrow but got canceled d/t corona     PREVENTIVE:  Colonoscopy: 2013, Dr. Antonia Olguin in [place] now, advised to schedule, reminded   Pap: Dr. Veronica covarrubias, 4/28/17, complete hysterectomy   Mammogram: 9/19, negative   Dexa: not yet needed  Tdap: 01/09/18   Pneumovax: not yet needed  Shingrix: not yet needed  Shingrix: discussed  01/19  Flu shot: Fall 2019   A1C: 1/18 5.7, 8/18 5.7 8/19 6.0 , 1/20 6.5  Eye exam: Dr. Tom Hanna, 2016, every 2 years, due, reminded  Lipids: 1/20  Chicken pox-- immune 2018     Patient Active Problem List   Diagnosis Code    Arthritis M19.90    Hemorrhoids K64.9    H/O total hysterectomy Z90.710    Weight gain R63.5    Breast cancer, right ([address] C50.911    S/P breast reconstruction Z98.82    Prediabetes R73.03    Elevated liver enzymes R74.8     Current Outpatient Medications   Medication Sig Dispense Refill    Blood-Glucose Meter monitoring kit Check sugar daily fasting. 1 Kit 0    lancets misc Check sugar daily fasting. 1 Each 11    glucose blood VI test strips (BLOOD GLUCOSE TEST) strip Check sugar daily fasting. 100 Strip 1    pantoprazole (PROTONIX) 20 mg tablet Take 1 Tab by mouth daily. 30 Tab 1    famotidine (PEPCID) 20 mg tablet TAKE 1 TABLET BY MOUTH TWICE A  Tab 0    fexofenadine (ALLEGRA) 180 mg tablet Take 180 mg by mouth daily as needed for Allergies.  cholecalciferol (VITAMIN D3) 1,000 unit cap Take 1,000 Units by mouth daily.  tamoxifen (NOLVADEX) 20 mg tablet Take 20 mg by mouth daily.         Lab Results   Component Value Date/Time    Cholesterol, total 183 01/24/2020 10:52 AM    HDL Cholesterol 66 01/24/2020 10:52 AM    LDL, calculated 95 01/24/2020 10:52 AM    Triglyceride 110 01/24/2020 10:52 AM     Lab Results   Component Value Date/Time    GFR est non- 01/24/2020 10:52 AM    GFR est  01/24/2020 10:52 AM    Creatinine 0.61 01/24/2020 10:52 AM    BUN 8 01/24/2020 10:52 AM    Sodium 146 (H) 01/24/2020 10:52 AM Potassium 4.6 01/24/2020 10:52 AM    Chloride 101 01/24/2020 10:52 AM    CO2 22 01/24/2020 10:52 AM        Review of Systems   Respiratory: Negative for shortness of breath. Cardiovascular: Negative for chest pain. Physical Exam  Constitutional:       General: She is not in acute distress. Appearance: Normal appearance. She is not ill-appearing, toxic-appearing or diaphoretic. HENT:      Head: Normocephalic and atraumatic. Eyes:      General:         Right eye: No discharge. Left eye: No discharge. Conjunctiva/sclera: Conjunctivae normal.   Neurological:      General: No focal deficit present. Mental Status: She is alert and oriented to person, place, and time. Psychiatric:         Mood and Affect: Mood normal.         Behavior: Behavior normal.         ASSESSMENT and PLAN    ICD-10-CM ICD-9-CM    1. Type 2 diabetes mellitus without complication, without long-term current use of insulin ([facility] [address]    New dx    Home readings good    Doing a great job w wt loss  No meds needed    Will continue to monitor E11.9 250.00    2. Elevated liver enzymes    Related to fatty liver  Should be improved w wt loss  Was improving last check    Sees [facility]  Will repeat lft at f/u  R74.8 790.5    3. Malignant neoplasm of right breast in female, estrogen receptor positive, unspecified site of breast ([address]      Controlled on tamoxifen  Will see Shereen Garcia again this summer C50.911 174.9     Z17.0 V86.0    4. Gastroesophageal reflux disease without esophagitis    Taking protoinx daily     Will try to decrease to every other day dosing  K21.9 530.81    5. Seasonal allergic rhinitis due to pollen    otc meds J30.1 477.0        Kal Hernandez is a 54 y.o. female being evaluated by a Virtual Visit (video visit) encounter to address concerns as mentioned above. A caregiver was present when appropriate.  Due to this being a TeleHealth encounter (During MPMJG-07 public health emergency), evaluation of the following organ systems was limited: Vitals/Constitutional/EENT/Resp/CV/GI//MS/Neuro/Skin/Heme-Lymph-Imm. Pursuant to the emergency declaration under the [address] [place] [address] and the [employer] Act, this Virtual Visit was conducted with patient's (and/or legal guardian's) consent, to reduce the risk of exposure to COVID-19 and provide necessary medical care. Services were provided through a video synchronous discussion virtually to substitute for in-person encounter. --Dat Davenport MD on 4/27/2020 at 3:03 PM    An electronic signature was used to authenticate this note.

## 2020-04-27 ENCOUNTER — VIRTUAL VISIT (OUTPATIENT)
Dept: INTERNAL MEDICINE CLINIC | Age: 55
End: 2020-04-27

## 2020-04-27 DIAGNOSIS — K21.9 GASTROESOPHAGEAL REFLUX DISEASE WITHOUT ESOPHAGITIS: ICD-10-CM

## 2020-04-27 DIAGNOSIS — C50.911 MALIGNANT NEOPLASM OF RIGHT BREAST IN FEMALE, ESTROGEN RECEPTOR POSITIVE, UNSPECIFIED SITE OF BREAST (HCC): ICD-10-CM

## 2020-04-27 DIAGNOSIS — E11.9 TYPE 2 DIABETES MELLITUS WITHOUT COMPLICATION, WITHOUT LONG-TERM CURRENT USE OF INSULIN (HCC): Primary | ICD-10-CM

## 2020-04-27 DIAGNOSIS — Z17.0 MALIGNANT NEOPLASM OF RIGHT BREAST IN FEMALE, ESTROGEN RECEPTOR POSITIVE, UNSPECIFIED SITE OF BREAST (HCC): ICD-10-CM

## 2020-04-27 DIAGNOSIS — R74.8 ELEVATED LIVER ENZYMES: ICD-10-CM

## 2020-04-27 DIAGNOSIS — J30.1 SEASONAL ALLERGIC RHINITIS DUE TO POLLEN: ICD-10-CM

## 2020-04-27 RX ORDER — PANTOPRAZOLE SODIUM 20 MG/1
TABLET, DELAYED RELEASE ORAL
Qty: 90 TAB | Refills: 1 | Status: SHIPPED | OUTPATIENT
Start: 2020-04-27 | End: 2021-01-29

## 2020-07-22 DIAGNOSIS — E11.8 CONTROLLED TYPE 2 DIABETES MELLITUS WITH COMPLICATION, WITHOUT LONG-TERM CURRENT USE OF INSULIN (HCC): ICD-10-CM

## 2020-07-24 NOTE — PROGRESS NOTES
HISTORY OF PRESENT ILLNESS  Nelida Grandchild is a 54 y.o. female. HPI     Last here 4/27/20 Pt is here for routine care. This is an established visit completed with telemedicine was completed with video assist  the patient acknowledges and agrees to this method of visitation     BP was well controlle din the past no home readings to reviewe   Wt is 145 lbs pt reports-- down 14 lbs x lov        She is excercising every day, walking   She is limiting her calories under 1200  Discussed this is a good calorie goal  She said she is reducing carbs and sugar  Discussed eating some sugar is okay now and then   Discussed continued diet and w/l  Discussed weight loss at length she is doing a great job with this she is improved her liver test substantially and blood sugar readings substantially with her weight loss     Reviewed labs 1/20  Pt is immune to chicken pox   Reviewed labs from Sue Cerda pt reports from July  A1c 5.9  Vit D 55.0  Cr 0.70  Prev 0.59  Glucose 105  Prev 114  AST 22  Prev 222      Patient had been a borderline diabetic she is back in the prediabetic range due to weight lossBS 102, 108, 97, 106, 113, 100, 93, 92  Discussed taking this 2x a week fasting  Discussed it's okay if there is an elevation because sometime's there is flukes    Pt follows with Dr. Sue Cerda (hem/onc) for h/o breast cancer  Pt had a lumpectomy in 1/16  Last visit was 7/24/20 - got labs will get report  She is still on tamoxifen for another year     Pt also sees Dr Rebecca Stout (rad onc)  Pt follows with Dr. Pako Bearden (plastics)   Pt had a breast reconstruction in 2/16  Continues on tamoxifen 20mg daily will take this for 5 years.   Has 1 more year to go should finish in 2021     Pt follows with Dr. Deny Venegas (hepato) for liver test abnormalities her LFTs have been in the 203 100 range but are now back to normal with weight loss  Last there  9/23/19  lft were higher last check w wt gain         Pt had a [facility] Procedure with Dr Bertha Lu (ortho) on 8/13/19   She had a f/u on 2/11/20 for f/u    Still has some struggles with her ankle        Pt now using pepcid for gerd   Her gerd has improved w wt loss but she still has sxs  She wonders if generic versus prescription medications if 1 is better than the other  Discussed every other day dosing      Pt has chronic back pain  Pt has used tramadol in the past--not currently, t no active issues with back pain     Pt used to take allegra and then, zyrtec     Saw dr Jose L Villafana gastroenterology and was to get colo but got canceled d/t alexi reviewed her note she is supposed to have an EGD and colonoscopy completed at the same time she will work on getting this scheduled towards the end of summer    She c/o leg and foot cramps sporadically on and off  Discussed stretching exercises and tonic water     PREVENTIVE:  Colonoscopy: 2013, Dr. Lainey Colby in [place] now,  Discussed getting it done end of august   Egd: discussed getting it done end of august  Pap: Dr. Mariah Rodríguez, 4/28/17, complete hysterectomy   Mammogram: 9/19, negative , ordered  Dexa: not yet needed  Tdap: 01/09/18   Pneumovax: not yet needed  Shingrix: not yet needed  Shingrix: discussed  01/19  Flu shot: Fall 2019   A1C: 1/18 5.7, 8/18 5.7 8/19 6.0 , 1/20 6.5 7/20 5.9 Dr. Linette Pizarro exam: Dr. Fern Pickard, 2016, every 2 years, due, reminded  Lipids: 1/20 LDL 95  Chicken pox-- immune 2018     Patient Active Problem List    Diagnosis Date Noted    Prediabetes 01/09/2018    Elevated liver enzymes 01/09/2018    S/P breast reconstruction 04/28/2017    Breast cancer, right ([address] 12/02/2015    Weight gain 12/17/2014    H/O total hysterectomy 06/17/2014    Hemorrhoids 01/14/2014    Arthritis      Current Outpatient Medications   Medication Sig Dispense Refill    pantoprazole (PROTONIX) 20 mg tablet TAKE 1 TABLET BY MOUTH EVERY DAY 90 Tab 1    famotidine (PEPCID) 20 mg tablet TAKE 1 TABLET BY MOUTH TWICE A  Tab 0    tamoxifen (NOLVADEX) 20 mg tablet Take 20 mg by mouth daily.  OneTouch Ultra Blue Test Strip strip CHECK SUGAR DAILY FASTING. 100 Strip 1    Blood-Glucose Meter monitoring kit Check sugar daily fasting. 1 Kit 0    lancets misc Check sugar daily fasting. 1 Each 11    fexofenadine (ALLEGRA) 180 mg tablet Take 180 mg by mouth daily as needed for Allergies.  cholecalciferol (VITAMIN D3) 1,000 unit cap Take 1,000 Units by mouth daily.        Past Surgical History:   Procedure Laterality Date    HX BREAST LUMPECTOMY Right 1/25/2016    RIGHT CENTRAL LUMPECTOMY (TYLECTOMY) & SENTINEL LYMPH NODE BIOPSY performed by Pedro Singh MD at [facility] AMBUL[facility]ORY OR    HX BREAST RECONSTRUCTION Right 2/10/2017    RIGHT BREAST RECONSTRUCTION WITH BREAST IMPLANT, LEFT BREAST AUGMENTATION AND MASTOPEXY performed by Preeti Hopson MD at [facility] MAIN OR    HX BREAST REDUCTION Left 2/10/2017    BREAST MASTOPEXY performed by Preeti Hopson MD at [facility]    ectopic preg and ovarian cystectomy    HX HYSTERECTOMY  2014    HX MASTOPEXY (BREAST LIFT) Left 02/10/2017    HX ORTHOPAEDIC Bilateral 2008    carpel tunnel release both wrists 2 weeks apart    IMPLANT BREAST SILICONE/EQ Bilateral 23/77/1043      Lab Results   Component Value Date/Time    WBC 5.3 01/24/2020 10:52 AM    HGB 14.5 01/24/2020 10:52 AM    HCT 43.9 01/24/2020 10:52 AM    PLATELET 667 76/17/3514 10:52 AM    MCV 91 01/24/2020 10:52 AM     Lab Results   Component Value Date/Time    Cholesterol, total 183 01/24/2020 10:52 AM    HDL Cholesterol 66 01/24/2020 10:52 AM    LDL, calculated 95 01/24/2020 10:52 AM    Triglyceride 110 01/24/2020 10:52 AM     Lab Results   Component Value Date/Time    GFR est non- 01/24/2020 10:52 AM    GFR est  01/24/2020 10:52 AM    Creatinine 0.61 01/24/2020 10:52 AM    BUN 8 01/24/2020 10:52 AM    Sodium 146 (H) 01/24/2020 10:52 AM    Potassium 4.6 01/24/2020 10:52 AM    Chloride 101 01/24/2020 10:52 AM    CO2 22 01/24/2020 10:52 AM Review of Systems   Respiratory: Negative for shortness of breath. Cardiovascular: Negative for chest pain. Physical Exam  Constitutional:       Appearance: Normal appearance. She is not toxic-appearing or diaphoretic. HENT:      Head: Normocephalic and atraumatic. Eyes:      Conjunctiva/sclera: Conjunctivae normal.      Pupils: Pupils are equal, round, and reactive to light. Pulmonary:      Effort: Pulmonary effort is normal. No respiratory distress. Neurological:      Mental Status: She is alert and oriented to person, place, and time. Mental status is at baseline. Gait: Gait normal.   Psychiatric:         Mood and Affect: Mood normal.         Behavior: Behavior normal.         ASSESSMENT and PLAN    ICD-10-CM ICD-9-CM    1. Malignant neoplasm of right breast in female, estrogen receptor positive, unspecified site of breast ([facility] [address]     Up-to-date with hematology we will get notes for review continue on tamoxifen for 1 more year C50.911 174.9 CARMEN MAMMO BI DX INCL CAD    Z17.0 V86.0    2. IFG (impaired fasting glucose)     A1c much improved just checked with hematology clinic was near or at the diabetic range in the past but has lost well over 20 to 30 pounds and has made a substantial improvement in her readings because of this    No medications needed for now continue with weight loss R73.01 790.21    3. Elevated liver enzymes     Has seen hepatology in the past LFTs much improved with substantial weight loss continue to monitor    Labs just completed with hematology will get reports R74.8 790.5    4. Gastroesophageal reflux disease without esophagitis       Controlled with Pepcid K21.9 530.81    5. Overweight     Continue with weight loss E66.3 278.02       Depression screen reviewed and negative      Scribed by Lorenzo Noguera, as dictated by Dr. Kacie Padilla. Current diagnosis and concerns discussed with pt at length.  Pt understands risks and benefits or current treatment plan and medications, and accepts the treatment and medication with any possible risks. Pt asks appropriate questions, which were answered. Pt was instructed to call with any concerns or problems. I have reviewed the note documented by the scribe. The services provided are my own. The documentation is accurate     Tigre Jackson, who was evaluated through a synchronous (real-time) audio-video encounter, and/or her healthcare decision maker, is aware that it is a billable service, with coverage as determined by her insurance carrier. She provided verbal consent to proceed: Yes, and patient identification was verified. It was conducted pursuant to the emergency declaration under the [facility], [address] [place] [address] authority and the [employer] General Act. A caregiver was present when appropriate. Ability to conduct physical exam was limited. I was at home. The patient was at home.

## 2020-07-27 ENCOUNTER — VIRTUAL VISIT (OUTPATIENT)
Dept: INTERNAL MEDICINE CLINIC | Age: 55
End: 2020-07-27

## 2020-07-27 DIAGNOSIS — R74.8 ELEVATED LIVER ENZYMES: ICD-10-CM

## 2020-07-27 DIAGNOSIS — C50.911 MALIGNANT NEOPLASM OF RIGHT BREAST IN FEMALE, ESTROGEN RECEPTOR POSITIVE, UNSPECIFIED SITE OF BREAST (HCC): Primary | ICD-10-CM

## 2020-07-27 DIAGNOSIS — R73.01 IFG (IMPAIRED FASTING GLUCOSE): ICD-10-CM

## 2020-07-27 DIAGNOSIS — K21.9 GASTROESOPHAGEAL REFLUX DISEASE WITHOUT ESOPHAGITIS: ICD-10-CM

## 2020-07-27 DIAGNOSIS — Z17.0 MALIGNANT NEOPLASM OF RIGHT BREAST IN FEMALE, ESTROGEN RECEPTOR POSITIVE, UNSPECIFIED SITE OF BREAST (HCC): Primary | ICD-10-CM

## 2020-07-27 DIAGNOSIS — E66.3 OVERWEIGHT: ICD-10-CM

## 2020-09-04 ENCOUNTER — HOSPITAL ENCOUNTER (OUTPATIENT)
Dept: VASCULAR SURGERY | Age: 55
Discharge: HOME | End: 2020-09-04
Attending: INTERNAL MEDICINE
Payer: COMMERCIAL

## 2020-09-04 DIAGNOSIS — M79.632 PAIN OF LEFT FOREARM: ICD-10-CM

## 2020-09-04 PROCEDURE — 93971 EXTREMITY STUDY: CPT

## 2020-10-05 ENCOUNTER — HOSPITAL ENCOUNTER (OUTPATIENT)
Dept: MAMMOGRAPHY | Age: 55
Discharge: HOME | End: 2020-10-05
Attending: INTERNAL MEDICINE
Payer: COMMERCIAL

## 2020-10-05 DIAGNOSIS — C50.911 MALIGNANT NEOPLASM OF RIGHT BREAST IN FEMALE, ESTROGEN RECEPTOR POSITIVE, UNSPECIFIED SITE OF BREAST (HCC): ICD-10-CM

## 2020-10-05 DIAGNOSIS — Z17.0 MALIGNANT NEOPLASM OF RIGHT BREAST IN FEMALE, ESTROGEN RECEPTOR POSITIVE, UNSPECIFIED SITE OF BREAST (HCC): ICD-10-CM

## 2020-10-05 PROCEDURE — 77066 DX MAMMO INCL CAD BI: CPT

## 2021-01-26 NOTE — PROGRESS NOTES
HISTORY OF PRESENT ILLNESS  Gavi Golden is a 54 y.o. female. HPI     Last here 7/27/20  Pt is here for routine care. This is an established visit completed with telemedicine was completed with video assist  the patient acknowledges and agrees to this method of visitation     BP at home 105/83    Patient had been a borderline diabetic she is back in the prediabetic range due to weight loss  BS 90 - she felt shaky with this, 108 111 99 103 102   Discussed this is nl due to higher BS in the past   Discussed nl range   Discussed she can test once a week  discussed talking to diabetic educator  She has been meeting with diabetic group    Wt is 142 lbs per pt -down 3 lbs x lov   She is trying to keep the wt off   Discussed continued diet and w/l     Reviewed labs     Labs with michelle 1/21: glucose 102, A1c 5.6, ast 19, alt 20, cr 0.56  Ordered labs      Pt follows with Dr. Danilo Chapman (hem/onc) for h/o breast cancer  Pt had a lumpectomy in 1/16  Last visit was 1/15/21- completed lab work   She is still on tamoxifen      Pt also sees Dr Butch Salamanca (rad onc)  Pt follows with Dr. Cecily Bearden (plastics)   Pt had a breast reconstruction in 2/16  Continues on tamoxifen 20mg daily will take this for 5 years.   Has 1 more year to go should finish in 2021     Pt follows with Dr. Quique Glaser (hepato) for liver test abnormalities her LFTs have been in the 203 100 range but are now back to normal with weight loss  Last there  9/23/19  lft were higher last check w wt gain         Pt had a [facility] Procedure with Dr Lawanda Freeman (ortho) on 8/13/19   She had a f/u on 2/11/20 for f/u    Still has some struggles with her ankle     Pt now using pepcid for gerd tala - still has breathrough sxs occasionally      Pt has chronic back pain  Pt has used tramadol in the past--not currently, t no active issues with back pain     Pt used to take allegra and then, zyrtec     Saw dr Fede Sorensen (GI) and was to get colo but got canceled d/t [place] - still holding off for now    Reviewed duplex 9/4/20: negative     Reviewed mammo 10/5/20 negative    PREVENTIVE:  Colonoscopy: 2013, Dr. Alirio Arevalo in [place] now, reminded  Egd: discussed getting it done end of august  Pap: Dr. Carmelo Leonard, 4/28/17, complete hysterectomy   Mammogram:10/5/20 negative  Dexa: not yet needed  Tdap: 01/09/18   Pneumovax: not yet needed  Shingrix: not yet needed  Shingrix: will wait   Flu shot: 10/20  A1C: 1/18 5.7, 8/18 5.7 8/19 6.0 , 1/20 6.5 7/20 5.9 Dr. Vladimir Busch exam: she will call back with date and name, recent   Lipids: 1/20 LDL 95  Chicken pox-- immune 2018     Patient Active Problem List    Diagnosis Date Noted    Prediabetes 01/09/2018    Elevated liver enzymes 01/09/2018    S/P breast reconstruction 04/28/2017    Breast cancer, right ([address] 12/02/2015    Weight gain 12/17/2014    H/O total hysterectomy 06/17/2014    Hemorrhoids 01/14/2014    Arthritis      Current Outpatient Medications   Medication Sig Dispense Refill    OneTouch Ultra Blue Test Strip strip CHECK SUGAR DAILY FASTING. 100 Strip 1    Blood-Glucose Meter monitoring kit Check sugar daily fasting. 1 Kit 0    lancets misc Check sugar daily fasting. 1 Each 11    famotidine (PEPCID) 20 mg tablet TAKE 1 TABLET BY MOUTH TWICE A  Tab 0    fexofenadine (ALLEGRA) 180 mg tablet Take 180 mg by mouth daily as needed for Allergies.  cholecalciferol (VITAMIN D3) 1,000 unit cap Take 1,000 Units by mouth daily.  tamoxifen (NOLVADEX) 20 mg tablet Take 20 mg by mouth daily.        Past Surgical History:   Procedure Laterality Date    HX BREAST LUMPECTOMY Right 1/25/2016    RIGHT CENTRAL LUMPECTOMY (TYLECTOMY) & SENTINEL LYMPH NODE BIOPSY performed by Cedrick Mata MD at [facility] AMBULATORY OR    HX BREAST RECONSTRUCTION Right 2/10/2017    RIGHT BREAST RECONSTRUCTION WITH BREAST IMPLANT, LEFT BREAST AUGMENTATION AND MASTOPEXY performed by Messi Michelle MD at [facility] MAIN OR    HX BREAST REDUCTION Left 2/10/2017 BREAST MASTOPEXY performed by Inga Leiva MD at [address]    ectopic preg and ovarian cystectomy    HX HYSTERECTOMY  2014    HX MASTOPEXY (BREAST LIFT) Left 02/10/2017    HX ORTHOPAEDIC Bilateral 2008    carpel tunnel release both wrists 2 weeks apart    IMPLANT BREAST SILICONE/EQ Bilateral 78/21/5798      Lab Results   Component Value Date/Time    WBC 5.3 01/24/2020 10:52 AM    HGB 14.5 01/24/2020 10:52 AM    HCT 43.9 01/24/2020 10:52 AM    PLATELET 650 59/52/8529 10:52 AM    MCV 91 01/24/2020 10:52 AM     Lab Results   Component Value Date/Time    Cholesterol, total 183 01/24/2020 10:52 AM    HDL Cholesterol 66 01/24/2020 10:52 AM    LDL, calculated 95 01/24/2020 10:52 AM    Triglyceride 110 01/24/2020 10:52 AM     Lab Results   Component Value Date/Time    GFR est non- 01/24/2020 10:52 AM    GFR est  01/24/2020 10:52 AM    Creatinine 0.61 01/24/2020 10:52 AM    BUN 8 01/24/2020 10:52 AM    Sodium 146 (H) 01/24/2020 10:52 AM    Potassium 4.6 01/24/2020 10:52 AM    Chloride 101 01/24/2020 10:52 AM    CO2 22 01/24/2020 10:52 AM        Review of Systems   Respiratory: Negative for shortness of breath. Cardiovascular: Negative for chest pain. Physical Exam  Constitutional:       General: She is not in acute distress. Appearance: Normal appearance. She is not ill-appearing, toxic-appearing or diaphoretic. HENT:      Head: Normocephalic and atraumatic. Eyes:      General:         Right eye: No discharge. Left eye: No discharge. Conjunctiva/sclera: Conjunctivae normal.   Pulmonary:      Effort: No respiratory distress. Neurological:      Mental Status: She is alert and oriented to person, place, and time. Mental status is at baseline. Gait: Gait normal.   Psychiatric:         Mood and Affect: Mood normal.         Behavior: Behavior normal.         ASSESSMENT and ENQQ18s min total time    ICD-10-CM ICD-9-CM    1.  Elevated liver enzymes     Back to normal with weight loss continue to work on weight loss     R74.8 790.5    2. IFG (impaired fasting glucose)       Discussed this at great length    Patient had readings in the diabetic range    However she made substantial adjustments to her diet and was able to lose a substantial amount of weight    Her blood sugars have significantly improved    A1c is significantly improved    Eye exam is up-to-date she will get the name of the eye doctor for me to review    Discussed meeting with Darnell Cerda if she wanted additional information on diabetes though currently her numbers are great    She will think about it and let me know if she would like more information     R73.01 790.21    3. Malignant neoplasm of right breast in female, estrogen receptor positive, unspecified site of breast [facility])       Up-to-date with hematology continues on tamoxifen C50.911 174.9     Z17.0 V86.0    4. Overweight       Doing a great job with weight loss continue E66.3 278.02       Depression screen reviewed and negative      Scribed by Unice Gaucher of [address] [place] [address] [address], as dictated by Dr. Christina Bruner. Current diagnosis and concerns discussed with pt at length. Pt understands risks and benefits or current treatment plan and medications, and accepts the treatment and medication with any possible risks. Pt asks appropriate questions, which were answered. Pt was instructed to call with any concerns or problems. I have reviewed the note documented by the scribe. The services provided are my own. The documentation is accurate     Coreen Donnelly, who was evaluated through a synchronous (real-time) audio-video encounter, and/or her healthcare decision maker, is aware that it is a billable service, with coverage as determined by her insurance carrier. She provided verbal consent to proceed: Yes, and patient identification was verified.  It was conducted pursuant to the emergency declaration under the [facility] [place]note 2[facility], [facility] authority and the [employer] General Act. A caregiver was present when appropriate. Ability to conduct physical exam was limited. I was at home. The patient was at home.

## 2021-01-29 ENCOUNTER — TELEPHONE (OUTPATIENT)
Dept: INTERNAL MEDICINE CLINIC | Age: 56
End: 2021-01-29

## 2021-01-29 ENCOUNTER — VIRTUAL VISIT (OUTPATIENT)
Dept: INTERNAL MEDICINE CLINIC | Age: 56
End: 2021-01-29
Payer: COMMERCIAL

## 2021-01-29 DIAGNOSIS — C50.911 MALIGNANT NEOPLASM OF RIGHT BREAST IN FEMALE, ESTROGEN RECEPTOR POSITIVE, UNSPECIFIED SITE OF BREAST (HCC): ICD-10-CM

## 2021-01-29 DIAGNOSIS — E66.3 OVERWEIGHT: ICD-10-CM

## 2021-01-29 DIAGNOSIS — R73.01 IFG (IMPAIRED FASTING GLUCOSE): ICD-10-CM

## 2021-01-29 DIAGNOSIS — Z17.0 MALIGNANT NEOPLASM OF RIGHT BREAST IN FEMALE, ESTROGEN RECEPTOR POSITIVE, UNSPECIFIED SITE OF BREAST (HCC): ICD-10-CM

## 2021-01-29 DIAGNOSIS — R74.8 ELEVATED LIVER ENZYMES: Primary | ICD-10-CM

## 2021-01-29 PROCEDURE — 99214 OFFICE O/P EST MOD 30 MIN: CPT | Performed by: INTERNAL MEDICINE

## 2021-01-29 NOTE — TELEPHONE ENCOUNTER
Pt states she was calling to give the name of her eye doctor which is Dr. Kamaljit Escobar. Provider requested this per pt.

## 2021-01-29 NOTE — TELEPHONE ENCOUNTER
MD Felicia Quiñonez LPN   Caller: Unspecified (Today,  1:45 PM)             Thanks request records/notes      Records requested

## 2021-07-16 LAB — HBA1C MFR BLD HPLC: 5.7 %

## 2021-07-19 ENCOUNTER — TRANSCRIBE ORDER (OUTPATIENT)
Dept: SCHEDULING | Age: 56
End: 2021-07-19

## 2021-07-19 DIAGNOSIS — Z12.31 VISIT FOR SCREENING MAMMOGRAM: Primary | ICD-10-CM

## 2021-07-26 ENCOUNTER — OFFICE VISIT (OUTPATIENT)
Dept: INTERNAL MEDICINE CLINIC | Age: 56
End: 2021-07-26
Payer: COMMERCIAL

## 2021-07-26 VITALS
HEART RATE: 92 BPM | BODY MASS INDEX: 29.27 KG/M2 | DIASTOLIC BLOOD PRESSURE: 82 MMHG | SYSTOLIC BLOOD PRESSURE: 130 MMHG | RESPIRATION RATE: 16 BRPM | WEIGHT: 155 LBS | HEIGHT: 61 IN | TEMPERATURE: 98.1 F | OXYGEN SATURATION: 97 %

## 2021-07-26 DIAGNOSIS — C50.911 MALIGNANT NEOPLASM OF RIGHT BREAST IN FEMALE, ESTROGEN RECEPTOR POSITIVE, UNSPECIFIED SITE OF BREAST (HCC): ICD-10-CM

## 2021-07-26 DIAGNOSIS — Z17.0 MALIGNANT NEOPLASM OF RIGHT BREAST IN FEMALE, ESTROGEN RECEPTOR POSITIVE, UNSPECIFIED SITE OF BREAST (HCC): ICD-10-CM

## 2021-07-26 DIAGNOSIS — R74.8 ELEVATED LIVER ENZYMES: ICD-10-CM

## 2021-07-26 DIAGNOSIS — E78.2 MIXED HYPERLIPIDEMIA: ICD-10-CM

## 2021-07-26 DIAGNOSIS — E11.9 TYPE 2 DIABETES MELLITUS WITHOUT COMPLICATION, WITHOUT LONG-TERM CURRENT USE OF INSULIN (HCC): ICD-10-CM

## 2021-07-26 DIAGNOSIS — Z00.00 PHYSICAL EXAM, ANNUAL: Primary | ICD-10-CM

## 2021-07-26 DIAGNOSIS — R73.01 IFG (IMPAIRED FASTING GLUCOSE): ICD-10-CM

## 2021-07-26 DIAGNOSIS — E66.3 OVERWEIGHT: ICD-10-CM

## 2021-07-26 PROCEDURE — 99396 PREV VISIT EST AGE 40-64: CPT | Performed by: INTERNAL MEDICINE

## 2021-07-26 NOTE — PROGRESS NOTES
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 HPI     Last here 1/29/21.  Pt is here for routine care.     She c/o dry and brittle nails, wonders if there is anything she can do about this  Discussed moisturizes nails    She c/o more cherry angiomas, would like a referral to derm  Will refer to derm  Discussed that the angiomas are very likely benign, but good to go to derm for skin checks     BP today is 130/82     Patient had been a borderline diabetic she is back in the prediabetic range due to weight loss     125 132  123 116 18 113 121 113 118 115 100 111 103 103 108 114 127  Discussed she can test once a week     Wt is 155 lbs, up 13 lbs x lov   Discussed that she should not stress much about this increase in weight, continue to work on w/l     Reviewed labs     Ordered labs      Pt follows with Dr. Giselle Forte (hem/onc) for h/o breast cancer  Pt had a lumpectomy in 1/16  Last visit was 7/21- completed lab work   She is no longer taking tamoxifen, 5 years cancer free    7/21 labs with Giselle Forte:  CBC normal  Creatinine 0.55  LFT nl  A1c 5.7    Vitamin D nl     Pt also sees Dr Lacey Martin (rad onc)  Pt follows with Dr. Clarissa Stern (plastics)   Pt had a breast reconstruction in 2/16  She took tamoxifen for 5 years this was recently stopped     Pt follows with Dr. Rajeev Vargas (hepato) for liver test abnormalities her LFTs have been in the 203 100 range but are now back to normal with weight loss  Last there  9/23/19  lft were higher last check w wt gain      Pt had a [facility] Procedure with Dr Calin Edmond (ortho) on 8/13/19   She had a f/u on 2/11/20 for f/u    Still has some struggles with her ankle     Pt now using pepcid for gerd edgar - still has breathrough sxs occasionally      Pt has chronic back pain  Pt has used tramadol in the past--not currently, t no active issues with back pain     Pt used to take allegra and then, zyrtec     Saw dr groves (GI) and was to get colo but got canceled d/t corona - still holding off for now     Discussed reasons for pelvic exam following hysterectomy     PREVENTIVE:  Colonoscopy: 2013, Dr. Jeferson Eng in [facility] with Dr. Hathaway Doctor 7/30/21, also EGD  Egd: will get 7/30/21  Pap: Dr. Tyler Blanc, 4/28/17, complete hysterectomy   Mammogram:10/5/20 negative  Dexa: not yet needed  Tdap: 01/09/18   Pneumovax: not yet needed  Shingrix: will get at her local pharmacy  Flu shot: 10/20  A1C: 1/18 5.7, 8/18 5.7 8/19 6.0 , 1/20 6.5 7/21 5.9 Dr. Maki Marking exam: Dr. Verona Mccrary 12/18/20  Lipids: 7/21   Chicken pox-- immune 2018     Patient Active Problem List    Diagnosis Date Noted    Prediabetes 01/09/2018    Elevated liver enzymes 01/09/2018    S/P breast reconstruction 04/28/2017    Breast cancer, right ([address] 12/02/2015    Weight gain 12/17/2014    H/O total hysterectomy 06/17/2014    Hemorrhoids 01/14/2014    Arthritis      Current Outpatient Medications   Medication Sig Dispense Refill    OneTouch Ultra Blue Test Strip strip CHECK SUGAR DAILY FASTING. 100 Strip 1    Blood-Glucose Meter monitoring kit Check sugar daily fasting. 1 Kit 0    lancets misc Check sugar daily fasting. 1 Each 11    famotidine (PEPCID) 20 mg tablet TAKE 1 TABLET BY MOUTH TWICE A  Tab 0    fexofenadine (ALLEGRA) 180 mg tablet Take 180 mg by mouth daily as needed for Allergies.  cholecalciferol (VITAMIN D3) 1,000 unit cap Take 1,000 Units by mouth daily.  tamoxifen (NOLVADEX) 20 mg tablet Take 20 mg by mouth daily.  (Patient not taking: Reported on 7/26/2021)       Past Surgical History:   Procedure Laterality Date    HX BREAST LUMPECTOMY Right 1/25/2016    RIGHT CENTRAL LUMPECTOMY (TYLECTOMY) & SENTINEL LYMPH NODE BIOPSY performed by Carlos Lindsey MD at [facility] AMBULATORY OR    HX BREAST RECONSTRUCTION Right 2/10/2017    RIGHT BREAST RECONSTRUCTION WITH BREAST IMPLANT, LEFT BREAST AUGMENTATION AND MASTOPEXY performed by Maryse Ghosh MD at [facility] MAIN OR    HX BREAST REDUCTION Left 2/10/2017    BREAST MASTOPEXY performed by Saleem Owne MD at [address]    ectopic preg and ovarian cystectomy    HX HYSTERECTOMY  2014    HX MASTOPEXY (BREAST LIFT) Left 02/10/2017    HX ORTHOPAEDIC Bilateral 2008    carpel tunnel release both wrists 2 weeks apart    IMPLANT BREAST SILICONE/EQ Bilateral 19/31/1963      Lab Results   Component Value Date/Time    WBC 5.3 01/24/2020 10:52 AM    HGB 14.5 01/24/2020 10:52 AM    HCT 43.9 01/24/2020 10:52 AM    PLATELET 365 81/46/1596 10:52 AM    MCV 91 01/24/2020 10:52 AM     Lab Results   Component Value Date/Time    Cholesterol, total 183 01/24/2020 10:52 AM    HDL Cholesterol 66 01/24/2020 10:52 AM    LDL, calculated 95 01/24/2020 10:52 AM    Triglyceride 110 01/24/2020 10:52 AM     Lab Results   Component Value Date/Time    GFR est non- 01/24/2020 10:52 AM    GFR est  01/24/2020 10:52 AM    Creatinine 0.61 01/24/2020 10:52 AM    BUN 8 01/24/2020 10:52 AM    Sodium 146 (H) 01/24/2020 10:52 AM    Potassium 4.6 01/24/2020 10:52 AM    Chloride 101 01/24/2020 10:52 AM    CO2 22 01/24/2020 10:52 AM        Review of Systems   Respiratory: Negative for shortness of breath. Cardiovascular: Negative for chest pain. Musculoskeletal:        Lump on arm   Skin:        Dry brittle nails, cherry angiomas       Physical Exam  Constitutional:       General: She is not in acute distress. Appearance: Normal appearance. She is not ill-appearing, toxic-appearing or diaphoretic. HENT:      Head: Normocephalic and atraumatic. Right Ear: External ear normal.      Left Ear: External ear normal.   Eyes:      General:         Right eye: No discharge. Left eye: No discharge. Conjunctiva/sclera: Conjunctivae normal.      Pupils: Pupils are equal, round, and reactive to light. Neck:      Vascular: No carotid bruit. Cardiovascular:      Rate and Rhythm: Normal rate and regular rhythm.       Heart sounds: No murmur heard. No friction rub. No gallop. Pulmonary:      Effort: No respiratory distress. Breath sounds: Normal breath sounds. No wheezing or rales. Chest:      Chest wall: No tenderness. Abdominal:      General: Abdomen is flat. There is no distension. Palpations: Abdomen is soft. There is no mass. Tenderness: There is no abdominal tenderness. There is no guarding or rebound. Hernia: No hernia is present. Musculoskeletal:         General: Normal range of motion. Cervical back: Normal range of motion and neck supple. Right lower leg: No edema. Left lower leg: No edema. Comments: Subcutaneous small nodule   Skin:     General: Skin is warm and dry. Comments: Scattered cherry angiomas on torso   Neurological:      Mental Status: She is alert and oriented to person, place, and time. Mental status is at baseline. Coordination: Coordination normal.      Gait: Gait normal.   Psychiatric:         Mood and Affect: Mood normal.         Behavior: Behavior normal.         ASSESSMENT and PLAN    ICD-10-CM ICD-9-CM    1. Physical exam, annual   Weight up from last year a little bit globally down continue to work on portions  Labs reviewed   colo scheduled   pelv exam due   mammo due in October  Will get shingrix at local pharm     Z00.00 V70.0 REFERRAL TO DERMATOLOGY   2. IFG (impaired fasting glucose)  R73.01 790.21    3. Type 2 diabetes mellitus without complication, without long-term current use of insulin (HCC)         Diet controlled she lost substantial amount of weight and blood sugars are more in the prediabetic range no medication needed recent A1c great at 5.7 continue with weight loss     E11.9 250.00    4. Malignant neoplasm of right breast in female, estrogen receptor positive, unspecified site of breast ([facility]  C50.911 174.9    No longer on tamoxifen this was recently stopped up-to-date with hematology     Z17.0 V86.0    5.  Elevated liver enzymes        From fatty liver much improved with weight loss normal recent check continue to monitor     R74.8 790.5    6. Mixed hyperlipidemia        Diet controlled     E78.2 272.2    7. Overweight        Recently gained back a little bit of weight but globally still has lost a lot of weight doing a great job she will get back on track and try to lose those 5 to 10 pounds she recently gained E66.3 278.02       Depression screen reviewed and negative     Scribed by Luis Gannon of [address] [place] [address] [address], as dictated by Dr. Robe Olmedo. Current diagnosis and concerns discussed with pt at length. Pt understands risks and benefits or current treatment plan and medications, and accepts the treatment and medication with any possible risks. Pt asks appropriate questions, which were answered. Pt was instructed to call with any concerns or problems. I have reviewed the note documented by the scribe. The services provided are my own.   The documentation is accurate

## 2021-07-30 ENCOUNTER — ANESTHESIA EVENT (OUTPATIENT)
Dept: ENDOSCOPY | Age: 56
End: 2021-07-30
Payer: COMMERCIAL

## 2021-07-30 ENCOUNTER — ANESTHESIA (OUTPATIENT)
Dept: ENDOSCOPY | Age: 56
End: 2021-07-30
Payer: COMMERCIAL

## 2021-07-30 ENCOUNTER — HOSPITAL ENCOUNTER (OUTPATIENT)
Age: 56
Setting detail: OUTPATIENT SURGERY
Discharge: HOME | End: 2021-07-30
Attending: INTERNAL MEDICINE | Admitting: INTERNAL MEDICINE
Payer: COMMERCIAL

## 2021-07-30 VITALS
SYSTOLIC BLOOD PRESSURE: 123 MMHG | RESPIRATION RATE: 17 BRPM | TEMPERATURE: 98.2 F | OXYGEN SATURATION: 97 % | HEART RATE: 76 BPM | DIASTOLIC BLOOD PRESSURE: 84 MMHG

## 2021-07-30 PROCEDURE — 74011250637 HC RX REV CODE- 250/637: Performed by: INTERNAL MEDICINE

## 2021-07-30 PROCEDURE — 74011250636 HC RX REV CODE- 250/636: Performed by: INTERNAL MEDICINE

## 2021-07-30 PROCEDURE — 74011250636 HC RX REV CODE- 250/636: Performed by: ANESTHESIOLOGY

## 2021-07-30 PROCEDURE — 77030019988 HC FCPS ENDOSC DISP BSC -B: Performed by: INTERNAL MEDICINE

## 2021-07-30 PROCEDURE — 88305 TISSUE EXAM BY PATHOLOGIST: CPT

## 2021-07-30 PROCEDURE — 76060000031 HC ANESTHESIA FIRST 0.5 HR: Performed by: INTERNAL MEDICINE

## 2021-07-30 PROCEDURE — 74011000250 HC RX REV CODE- 250: Performed by: ANESTHESIOLOGY

## 2021-07-30 PROCEDURE — 2709999900 HC NON-CHARGEABLE SUPPLY: Performed by: INTERNAL MEDICINE

## 2021-07-30 PROCEDURE — 76040000019: Performed by: INTERNAL MEDICINE

## 2021-07-30 RX ORDER — ATROPINE SULFATE 0.1 MG/ML
0.5 INJECTION INTRAVENOUS
Status: DISCONTINUED | OUTPATIENT
Start: 2021-07-30 | End: 2021-07-30 | Stop reason: HOSPADM

## 2021-07-30 RX ORDER — LIDOCAINE HYDROCHLORIDE 20 MG/ML
INJECTION, SOLUTION EPIDURAL; INFILTRATION; INTRACAUDAL; PERINEURAL AS NEEDED
Status: DISCONTINUED | OUTPATIENT
Start: 2021-07-30 | End: 2021-07-30 | Stop reason: HOSPADM

## 2021-07-30 RX ORDER — PROPOFOL 10 MG/ML
INJECTION, EMULSION INTRAVENOUS AS NEEDED
Status: DISCONTINUED | OUTPATIENT
Start: 2021-07-30 | End: 2021-07-30 | Stop reason: HOSPADM

## 2021-07-30 RX ORDER — SODIUM CHLORIDE 0.9 % (FLUSH) 0.9 %
5-40 SYRINGE (ML) INJECTION EVERY 8 HOURS
Status: DISCONTINUED | OUTPATIENT
Start: 2021-07-30 | End: 2021-07-30 | Stop reason: HOSPADM

## 2021-07-30 RX ORDER — SODIUM CHLORIDE 9 MG/ML
100 INJECTION, SOLUTION INTRAVENOUS CONTINUOUS
Status: DISCONTINUED | OUTPATIENT
Start: 2021-07-30 | End: 2021-07-30 | Stop reason: HOSPADM

## 2021-07-30 RX ORDER — SODIUM CHLORIDE 0.9 % (FLUSH) 0.9 %
5-40 SYRINGE (ML) INJECTION AS NEEDED
Status: DISCONTINUED | OUTPATIENT
Start: 2021-07-30 | End: 2021-07-30 | Stop reason: HOSPADM

## 2021-07-30 RX ORDER — FLUMAZENIL 0.1 MG/ML
0.2 INJECTION INTRAVENOUS
Status: DISCONTINUED | OUTPATIENT
Start: 2021-07-30 | End: 2021-07-30 | Stop reason: HOSPADM

## 2021-07-30 RX ORDER — MIDAZOLAM HYDROCHLORIDE 1 MG/ML
.25-5 INJECTION, SOLUTION INTRAMUSCULAR; INTRAVENOUS
Status: DISCONTINUED | OUTPATIENT
Start: 2021-07-30 | End: 2021-07-30 | Stop reason: HOSPADM

## 2021-07-30 RX ORDER — EPINEPHRINE 0.1 MG/ML
1 INJECTION INTRACARDIAC; INTRAVENOUS
Status: DISCONTINUED | OUTPATIENT
Start: 2021-07-30 | End: 2021-07-30 | Stop reason: HOSPADM

## 2021-07-30 RX ORDER — NALOXONE HYDROCHLORIDE 0.4 MG/ML
0.4 INJECTION, SOLUTION INTRAMUSCULAR; INTRAVENOUS; SUBCUTANEOUS
Status: DISCONTINUED | OUTPATIENT
Start: 2021-07-30 | End: 2021-07-30 | Stop reason: HOSPADM

## 2021-07-30 RX ORDER — DEXTROMETHORPHAN/PSEUDOEPHED 2.5-7.5/.8
1.2 DROPS ORAL
Status: DISCONTINUED | OUTPATIENT
Start: 2021-07-30 | End: 2021-07-30 | Stop reason: HOSPADM

## 2021-07-30 RX ADMIN — PROPOFOL 400 MG: 10 INJECTION, EMULSION INTRAVENOUS at 09:31

## 2021-07-30 RX ADMIN — LIDOCAINE HYDROCHLORIDE 40 MG: 20 INJECTION, SOLUTION EPIDURAL; INFILTRATION; INTRACAUDAL; PERINEURAL at 09:11

## 2021-07-30 RX ADMIN — SIMETHICONE 80 MG: 20 SUSPENSION/ DROPS ORAL at 09:25

## 2021-07-30 RX ADMIN — SODIUM CHLORIDE 100 ML/HR: 900 INJECTION, SOLUTION INTRAVENOUS at 09:10

## 2021-07-30 NOTE — ANESTHESIA POSTPROCEDURE EVALUATION
Procedure(s):  ESOPHAGOGASTRODUODENOSCOPY (EGD)  COLONOSCOPY  ESOPHAGOGASTRODUODENAL (EGD) BIOPSY. total IV anesthesia    Anesthesia Post Evaluation        Patient location during evaluation: PACU  Note status: Adequate. Level of consciousness: responsive to verbal stimuli and sleepy but conscious  Pain management: satisfactory to patient  Airway patency: patent  Anesthetic complications: no  Cardiovascular status: acceptable  Respiratory status: acceptable  Hydration status: acceptable  Comments: +Post-Anesthesia Evaluation and Assessment    Patient: Danelle Cardenas MRN: [id number]  SSN: [id number]   YOB: 1965  Age: 64 y.o. Sex: female      Cardiovascular Function/Vital Signs    /84   Pulse 76   Temp 36.8 °C (98.2 °F)   Resp 17   SpO2 97%     Patient is status post Procedure(s):  ESOPHAGOGASTRODUODENOSCOPY (EGD)  COLONOSCOPY  ESOPHAGOGASTRODUODENAL (EGD) BIOPSY. Nausea/Vomiting: Controlled. Postoperative hydration reviewed and adequate. Pain:  Pain Scale 1: Numeric (0 - 10) (07/30/21 0940)  Pain Intensity 1: 0 (07/30/21 0940)   Managed. Neurological Status: At baseline. Mental Status and Level of Consciousness: Arousable. Pulmonary Status:   O2 Device: None (Room air) (07/30/21 0945)   Adequate oxygenation and airway patent. Complications related to anesthesia: None    Post-anesthesia assessment completed. No concerns. Signed By: Evin Cat MD    7/30/2021  Post anesthesia nausea and vomiting:  controlled      INITIAL Post-op Vital signs:   Vitals Value Taken Time   /84 07/30/21 0954   Temp 36.8 °C (98.2 °F) 07/30/21 0940   Pulse 76 07/30/21 0957   Resp 20 07/30/21 0957   SpO2 97 % 07/30/21 0957   Vitals shown include unvalidated device data.

## 2021-07-30 NOTE — DISCHARGE INSTRUCTIONS
Sherin Elam MD  [facility], [address]  [place], [address] [facility]  [phone]  [email or web address]    Jaquelin Lynn  [id number]  1965    EGD DISCHARGE INSTRUCTIONS    Discomfort:  Sore throat- throat lozenges or warm salt water gargle  redness at IV site- apply warm compress to area; if redness or soreness persist- contact your physician  Gaseous discomfort- walking, belching will help relieve any discomfort  You may not operate a vehicle for 12 hours  You may not engage in an occupation involving machinery or appliances for rest of today  You may not drink alcoholic beverages for at least 12 hours  Avoid making any critical decisions for at least 24 hour  DIET  You may have anything by mouth. You may eat and drink immediately. You may resume your regular diet - however -  remember your colon is empty and a heavy meal will produce gas. Avoid these foods:  vegetables, fried / greasy foods, carbonated drinks      ACTIVITY  You may resume your normal daily activities   Spend the remainder of the day resting -  avoid any strenuous activity. CALL M.D. ANY SIGN OF   Increasing pain, nausea, vomiting  Abdominal distension (swelling)  New increased bleeding (oral or rectal)  Fever (chills)  Pain in chest area  Bloody discharge from nose or mouth  Shortness of breath    Follow-up Instructions:   Call Dr. Sherin Elam for any questions or problems. Telephone # [phone]  Dr. [facility] office will notify you of the biopsy results available  Within 7 to 10 days. We will call you or send a letter   Continue same medications. ENDOSCOPY FINDINGS:   Your endoscopy showed some mild esophagitis and mild gastritis. Biopsies were taken.       DISCHARGE SUMMARY from Nurse    The following personal items collected during your admission are returned to you:   Dental Appliance: Dental Appliances: None  Vision: Visual Aid: Glasses  Hearing Aid: Jewelry:    Clothing:    Other Valuables:    Valuables sent to s  COLON DISCHARGE INSTRUCTIONS  Discomfort:  Redness at IV site- apply warm compress to area; if redness or soreness persist- contact your physician  There may be a slight amount of blood passed from the rectum  Gaseous discomfort- walking, belching will help relieve any discomfort  You may not operate a vehicle for 12 hours  You may not engage in an occupation involving machinery or appliances for rest of today  You may not drink alcoholic beverages for at least 12 hours  Avoid making any critical decisions for at least 24 hour  DIET:   High fiber diet. - however -  remember your colon is empty and a heavy meal will produce gas. Avoid these foods:  vegetables, fried / greasy foods, carbonated drinks for today      ACTIVITY:  You may resume your normal daily activities it is recommended that you spend the remainder of the day resting -  avoid any strenuous activity. CALL M.D. ANY SIGN OF:   Increasing pain, nausea, vomiting  Abdominal distension (swelling)  New increased bleeding (oral or rectal)  Fever (chills)  Pain in chest area  Bloody discharge from nose or mouth  Shortness of breath     COLONOSCOPY FINDINGS:  Your colonoscopy showed: NO polyps found. Do have some internal and external hemorrhoids. Follow-up Instructions:   Call Dr. Franny Rodarte if any questions or problems. Telephone # [phone]  Dr. Melvin Valderrama office will notify you of the biopsy results within 7 to 10 days. Should have a repeat colonoscopy in 5 years.

## 2021-07-30 NOTE — PROGRESS NOTES
[facility]  1965  [id number]    Situation:  Verbal report received from: Paul Bourgeois RN  Procedure: Procedure(s):  ESOPHAGOGASTRODUODENOSCOPY (EGD)  COLONOSCOPY  ESOPHAGOGASTRODUODENAL (EGD) BIOPSY    Background:    Preoperative diagnosis: Type 1 diabetes mellitus without complication ([facility] [G24.8]  Dyspepsia [R10.13]  History of colon polyps [Z86.010]  Postoperative diagnosis: egd- esophagitis, gastritis  colon-hemorrhoids    :  Dr. Ernestina Rojas  Assistant(s): Endoscopy RN-1: Gely Estrada  Endoscopy RN-2: Fish Bunn RN    Specimens:   ID Type Source Tests Collected by Time Destination   1 : gastric antrum bx Preservative Gastric  Tyrese Page MD 7/30/2021 8641 Pathology   2 : GE Junction bx Preservative GE Jarret Bergeron MD 7/30/2021 2115 Pathology     H. Pylori  no    Assessment:  Intra-procedure medications     Anesthesia gave intra-procedure sedation and medications, see anesthesia flow sheet yes    Intravenous fluids: NS@ KVO     Vital signs stable yes    Abdominal assessment: round and soft yes    Recommendation:  Discharge patient per MD order yes  Return to floor n/a  Family or Gildawn Roxton,   Permission to share finding with family or friend yes

## 2021-07-30 NOTE — PROCEDURES
[facility]                  Colonoscopy Operative Report    7/30/2021      Lele Sanches  [id number]  1965    Procedure Type:   Colonoscopy --screening     Indications:    Personal history of colon polyps (screening only)     Pre-operative Diagnosis: see indication above    Post-operative Diagnosis:  See findings below    :  Willow Simmonds, MD    Referring Provider: Abel Monteiro MD      Sedation:  MAC anesthesia Propofol    Pre-Procedural Exam:      Airway: clear,  No airway problems anticipated  Heart: RRR, without gallops or rubs  Lungs: clear bilaterally without wheezes, crackles, or rhonchi  Abdomen: soft, nontender, nondistended, bowel sounds present  Mental Status: awake, alert and oriented to person, place and time     Procedure Details:  After informed consent was obtained with all risks and benefits of procedure explained and preoperative exam completed, the patient was taken to the endoscopy suite and placed in the left lateral decubitus position. Upon sequential sedation as per above, a digital rectal exam was performed . The Olympus videocolonoscope  was inserted in the rectum and carefully advanced to the cecum, which was identified by the ileocecal valve and appendiceal orifice. The cecum was identified by the ileocecal valve and appendiceal orifice. The quality of preparation was good. The colonoscope was slowly withdrawn with careful evaluation between folds. Retroflexion in the rectum was completed demonstrating internal and external hemorrhoids. Findings:   Rectum: Grade 1 internal and external hemorrhoid(s); Sigmoid: normal  Descending Colon: normal  Transverse Colon: normal  Ascending Colon: normal  Cecum: normal  Terminal Ileum: not intubated      Specimen Removed:  none    Complications: None. EBL:  None.     Impression:    normal colonic mucosa throughout  hemorrhoids internal and external, Moderate in size    Recommendations: --Repeat colonoscopy in 5 years. High fiber diet. Resume normal medication(s). Discharge Disposition:  Home in the company of a  when able to ambulate. Darlene Gee MD    7/30/2021     KERWIN Hdez MD  [facility], [address]  [address]  [phone]  [email or web address]

## 2021-07-30 NOTE — ANESTHESIA PREPROCEDURE EVALUATION
Anesthetic History     PONV (motion sickness)          Review of Systems / Medical History  Patient summary reviewed, nursing notes reviewed and pertinent labs reviewed    Pulmonary  Within defined limits                 Neuro/Psych   Within defined limits           Cardiovascular              Hyperlipidemia    Exercise tolerance: >4 METS  Comments: 08/19 EKG= SR, SA, low volts   GI/Hepatic/Renal     GERD: poorly controlled      Liver disease (fatty liver)     Endo/Other    Diabetes    Obesity, arthritis and cancer ( right breast)  Pertinent negatives: No morbid obesity   Other Findings   Comments: Chronic low back pain   vertigo           Physical Exam    Airway  Mallampati: I  TM Distance: 4 - 6 cm  Neck ROM: normal range of motion   Mouth opening: Normal     Cardiovascular    Rhythm: regular  Rate: normal         Dental    Dentition: Caps/crowns  Comments:  On molars   Pulmonary  Breath sounds clear to auscultation               Abdominal  GI exam deferred       Other Findings            Anesthetic Plan    ASA: 2  Anesthesia type: general, total IV anesthesia and regional          Induction: Intravenous  Anesthetic plan and risks discussed with: Patient      Propofol MAC

## 2021-07-30 NOTE — PROCEDURES
[facility]                   Endoscopic Gastroduodenoscopy Procedure Note      7/30/2021  Carola Squires  1965  [id number]    Procedure: Endoscopic Gastroduodenoscopy with biopsy    Indication:  Dyspepsia-acid, GERD     Pre-operative Diagnosis: see indication above    Post-operative Diagnosis: see findings below    : KERWIN Marroquin MD    Referring Provider:  Tina Alicia MD      Anesthesia/Sedation:  MAC anesthesia Propofol    Airway assessment: No airway problems anticipated    Pre-Procedural Exam:      Airway: clear, no airway problems anticipated  Heart: RRR, without gallops or rubs  Lungs: clear bilaterally without wheezes, crackles, or rhonchi  Abdomen: soft, nontender, nondistended, bowel sounds present  Mental Status: awake, alert and oriented to person, place and time       Procedure Details     After infomed consent was obtained for the procedure, with all risks and benefits of procedure explained the patient was taken to the endoscopy suite and placed in the left lateral decubitus position. Following sequential administration of sedation as per above, the endoscope was inserted into the mouth and advanced under direct vision to second portion of the duodenum. A careful inspection was made as the gastroscope was withdrawn, including a retroflexed view of the proximal stomach; findings and interventions are described below. Findings:   Esophagus:mild esophagitis at the GE junction, biopsied  Stomach: mild chronic appearing gastritis, biopsied  Duodenum: normal    Therapies:  1. Gastric antral biopsies  2 biopsies of GE junction    Specimens: 1. Gastric antral biopsies  2 biopsies of GE junction           Complications:   None; patient tolerated the procedure well. EBL:  None. Impression:      -1. Mild esophagitis  2. Mild gastrtisi  3. Normal duodenum    Recommendations:  -Continue acid suppression. , -Await pathology.     Crystal Barrientos Zarina Kay., MD7/30/2021

## 2021-07-30 NOTE — H&P
Shena Jack MD  [facility], [address] [place] [address]  [phone]  [email or web address]      Gastroenterology Outpatient History and Physical    Patient: Lele Sanches    Physician: Ottoniel Bradshaw MD    Vital Signs: Last menstrual period 04/11/2014. Allergies: No Known Allergies    Chief Complaint: upset stomach      History of Present Illness: Here for screening colonoscopy for hx of colonic polyps. Also having EGD because of GERD and dyspepsia.     History:  Past Medical History:   Diagnosis Date    Arthritis     lower back    Breast cancer, right ([facility] [address] 12/2015    Dr. Axel Almeida Chronic pain     Lumbar Pain    Diabetes ([facility] [address]     no meds     Fatty liver disease, nonalcoholic     Dr. Copeland Counts GERD (gastroesophageal reflux disease)     Hypercholesterolemia     Nausea & vomiting     nausea after procedure, pt states she does have motion sickness    Radiation therapy complication 8165 March 2016      Past Surgical History:   Procedure Laterality Date    HX BREAST LUMPECTOMY Right 1/25/2016    RIGHT CENTRAL LUMPECTOMY (TYLECTOMY) & SENTINEL LYMPH NODE BIOPSY performed by Paco Moss MD at [place] [address] HX BREAST RECONSTRUCTION Right 2/10/2017    RIGHT BREAST RECONSTRUCTION WITH BREAST IMPLANT, LEFT BREAST AUGMENTATION AND MASTOPEXY performed by Rolan Babinski, MD at [facility] MAIN OR    HX BREAST REDUCTION Left 2/10/2017    BREAST MASTOPEXY performed by Rolan Babinski, MD at [address] [place] [address]    ectopic preg and ovarian cystectomy    HX HYSTERECTOMY  2014    HX MASTOPEXY (BREAST LIFT) Left 02/10/2017    HX ORTHOPAEDIC Bilateral 2008    carpel tunnel release both wrists 2 weeks apart    HX ORTHOPAEDIC Left 08/2019    achilles tendon repair    IMPLANT BREAST SILICONE/EQ Bilateral 04/42/3179      Social History     Socioeconomic History    Marital status:      Spouse name: Not on file    Number of children: Not on file    Years of education: Not on file    Highest education level: Not on file   Tobacco Use    Smoking status: Never Smoker    Smokeless tobacco: Never Used   Vaping Use    Vaping Use: Never used   Substance and Sexual Activity    Alcohol use: No    Drug use: No    Sexual activity: Yes     Partners: Male     Social Determinants of Health     Financial Resource Strain:     Difficulty of Paying Living Expenses:    Food Insecurity:     Worried About Running Out of Food in the Last Year:     [address] in the Last Year:    Transportation Needs:     Lack of Transportation (Medical):  Lack of Transportation (Non-Medical):    Physical Activity:     Days of Exercise per Week:     Minutes of Exercise per Session:    Stress:     Feeling of Stress :    Social Connections:     Frequency of Communication with Friends and Family:     Frequency of Social Gatherings with Friends and Family:     Attends [employer] Services:     Active Member of Clubs or Organizations:     Attends Club or Organization Meetings:     Marital Status:       Family History   Problem Relation Age of Onset    Diabetes Mother     Heart Disease Mother         myocardial infarction    Heart Disease Father         myocardial infarction    Breast Cancer Paternal Aunt     Breast Cancer Paternal Grandmother       Patient Active Problem List   Diagnosis Code    Arthritis M19.90    Hemorrhoids K64.9    H/O total hysterectomy Z90.710    Weight gain R63.5    Breast cancer, right ([facility] [address] C50.911    S/P breast reconstruction Z98.890    Prediabetes R73.03    Elevated liver enzymes R74.8         Medications:   Prior to Admission medications    Medication Sig Start Date End Date Taking?  Authorizing Provider   OneTouch Ultra Blue Test Strip strip CHECK SUGAR DAILY FASTING. 7/22/20  Yes Stef Sheriff MD   Blood-Glucose Meter monitoring kit Check sugar daily fasting. 1/29/20  Yes Aimee Mayes General Vira Amador MD   lancets misc Check sugar daily fasting. 1/29/20  Yes Stewart Freeman MD   famotidine (PEPCID) 20 mg tablet TAKE 1 TABLET BY MOUTH TWICE A DAY 1/2/20  Yes Stewart Freeman MD   fexofenadine (ALLEGRA) 180 mg tablet Take 180 mg by mouth daily as needed for Allergies. Yes Provider, Historical   cholecalciferol (VITAMIN D3) 1,000 unit cap Take 1,000 Units by mouth daily. Yes Provider, Historical   tamoxifen (NOLVADEX) 20 mg tablet Take 20 mg by mouth daily.   Patient not taking: Reported on 7/26/2021    Provider, Historical       Physical Exam:     General: well developed, well nourished   HEENT: unremarkable   Heart: regular rhythm no mumur    Lungs: clear   Abdominal:  benign   Neurological: unremarkable   Extremities: no edema     Findings/Diagnosis: history of colonic polyp and GERD and dyspepsia    Plan of Care/Planned Procedure: EGD and colonoscopy with  monitored anesthesia care sedation       Signed:  Raffy Molina MD 7/30/2021

## 2021-09-13 ENCOUNTER — OFFICE VISIT (OUTPATIENT)
Dept: HEMATOLOGY | Age: 56
End: 2021-09-13
Payer: COMMERCIAL

## 2021-09-13 VITALS
WEIGHT: 163.4 LBS | BODY MASS INDEX: 30.85 KG/M2 | HEIGHT: 61 IN | SYSTOLIC BLOOD PRESSURE: 117 MMHG | OXYGEN SATURATION: 97 % | HEART RATE: 88 BPM | RESPIRATION RATE: 16 BRPM | DIASTOLIC BLOOD PRESSURE: 82 MMHG | TEMPERATURE: 96.9 F

## 2021-09-13 DIAGNOSIS — R74.8 ELEVATED LIVER ENZYMES: Primary | ICD-10-CM

## 2021-09-13 PROCEDURE — 99213 OFFICE O/P EST LOW 20 MIN: CPT | Performed by: NURSE PRACTITIONER

## 2021-09-13 PROCEDURE — 91200 LIVER ELASTOGRAPHY: CPT | Performed by: NURSE PRACTITIONER

## 2021-09-13 RX ORDER — PANTOPRAZOLE SODIUM 40 MG/1
40 TABLET, DELAYED RELEASE ORAL DAILY
COMMUNITY
Start: 2021-08-04

## 2021-09-13 NOTE — PROGRESS NOTES
Sherren Mutters, MD, Padilla Humphrey, MD Aziza Zayas, PA-C    Viet Masterson, [facility]-BC     Winter Mccullough, [facility]   Hayes Rodrigues, FNP-C    Jose Suarez, [facility]       [facility] [address]    at [place] [address] [address] [facility], [address] [facility], [facility] [address].    [phone]    FAX: [address] [place] [address], [address], [address]    [phone]    FAX: [phone]       Patient Care Team:  Anderson Piedra MD as PCP - General (Internal Medicine)  Anderson Piedra MD as PCP - [facility]  Destin Sheppard MD as Physician (Gynecology)  Jen Loving MD as Surgeon (General Surgery)  Eda Ga MD as Physician (Radiation Oncology)  Vijay Rhodes MD as Physician (Hematology and Oncology)  Madi Borjas MD as Surgeon (Plastic Surgery)  Valentino Hicks MD (Hepatology)      Problem List  Date Reviewed: 7/26/2021        Codes Class Noted    Prediabetes ICD-10-CM: R73.03  ICD-9-CM: 790.29  1/9/2018        Elevated liver enzymes ICD-10-CM: R74.8  ICD-9-CM: 790.5  1/9/2018        S/P breast reconstruction ICD-10-CM: T28.044  ICD-9-CM: V43.82  4/28/2017        [facility] ICD-10-CM: C50.911  ICD-9-CM: 174.9  12/2/2015    Overview Signed 4/28/2017  1:02 PM by Valentino Hicks MD     Lumpectomy, XRT 2016             Weight gain ICD-10-CM: R63.5  ICD-9-CM: 783.1  12/17/2014        H/O total hysterectomy ICD-10-CM: Z90.710  ICD-9-CM: V88.01  6/17/2014        Arthritis ICD-10-CM: M19.90  ICD-9-CM: 716.90  Unknown    Overview Signed 1/14/2014  3:04 PM by Destin Person     lower back             Hemorrhoids ICD-10-CM: K64.9  ICD-9-CM: 455.6  1/14/2014              Mary Andrade returns to the [facility] [place] for management of elevated liver enzymes. The active problem list, all pertinent past medical history, medications, radiologic findings and laboratory findings related to the liver disorder were reviewed with the patient. The patient is a 64 y.o.  female who was first noted to have abnormalities in liver enzymes in 1/2016 at the time of her breast cancer diagnosis. The patient completed 5 years of Tamoxifen. Serologic evaluation for markers of chronic liver disease were assessed at her initial office visit and are normal/negative. Ultrasound of the liver was performed in 1/2017. The results of the imaging suggested chronic liver disease, steatosis. An assessment of liver fibrosis with elastography has been performed. This showed no fibrosis and a CAP score over 250, consistent with steatosis. She returns for follow-up Fibroscan. The patient had not started any new medications within 3 months preceeding the elevation in liver chemistries. She was subsequently started on tamoxifen in 4/2016 after the initial finding of elevated enzymes. She has otherwise tolerated this medication well and is planned to continue with administration through 2021. Since the last office visit the patient has maintained weight loss and recent laboratory testing by the PCP demonstrated normal liver enzymes. She completed Tamoxifen treatment. At initial presentation she had an intentional weight loss of 30 lbs over an 8 month period. The weight has been fairly well maintained with intermittent fluctuations. Weight has decreased 15 lbs since 1/2020. She notes losing an additional 8 lbs but has not been as diligent with the diet and gained some of the weight back. The patient has no symptoms which could be attributed to the liver disorder. The patient completes all daily activities without any functional limitations.   The patient has not experienced fatigue, pain in the right side over the liver. She has discontinued the use of all alcohol and acetaminophen products since 4/2017. ASSESSMENT AND PLAN:  NAFLD with no fibrosis. Assessment of liver fibrosis was performed with Fibroscan in the office today. The result was 4.1 kPa which correlates with no fibrosis. The CAP score of 289 suggests hepatic steatosis. Laboratory testing to monitor liver function and degree of liver injury was performed by PCP 7/19/2021. This included CMP, and  CBC with platelet count. The liver transaminases, ALP, liver function and platelet count are normal.     The patient has completed Tamoxifen, liver enzymes have remained normal. She will only need to return on an as needed basis. Recommend she continue follow-up with the PCP for liver enzyme testing. She should return if these elevate. The patient was directed to continue all current medications at the current dosages. There are no contraindications for the patient to take any medications that are necessary for treatment of other medical issues. The patient was counseled regarding alcohol consumption. Vaccination for viral hepatitis B is recommended since the patient has no serologic evidence of previous exposure or vaccination with immunity. She has had prior administration, but as she is in home health care, would recommend a second course of HBV vaccine to be administered. Vaccination for viral hepatitis A is not needed. The patient has serologic evidence of prior exposure or vaccination with immunity. ALLERGIES  No Known Allergies    MEDICATIONS  Current Outpatient Medications   Medication Sig    pantoprazole (PROTONIX) 40 mg tablet Take 40 mg by mouth daily.  OneTouch Ultra Blue Test Strip strip CHECK SUGAR DAILY FASTING.  Blood-Glucose Meter monitoring kit Check sugar daily fasting.  lancets misc Check sugar daily fasting.     famotidine (PEPCID) 20 mg tablet TAKE 1 TABLET BY MOUTH TWICE A DAY    fexofenadine (ALLEGRA) 180 mg tablet Take 180 mg by mouth daily as needed for Allergies.  cholecalciferol (VITAMIN D3) 1,000 unit cap Take 1,000 Units by mouth daily.  tamoxifen (NOLVADEX) 20 mg tablet Take 20 mg by mouth daily. No current facility-administered medications for this visit. SYSTEM REVIEW NOT RELATED TO LIVER DISEASE OR REVIEWED ABOVE:  Constitution systems: Negative for fever, chills. Weight gain recently. Eyes: Negative for visual changes. ENT: Negative for sore throat, painful swallowing. Respiratory: Negative for cough, hemoptysis, SOB. Cardiology: Negative for chest pain, palpitations. GI:  Negative for constipation or diarrhea. : Negative for urinary frequency, dysuria, hematuria, nocturia. Skin: Negative for rash. Hematology: Negative for easy bruising, blood clots. Musculo-skeletal: Negative for back pain, muscle pain, weakness. Neurologic: Negative for headaches, dizziness, vertigo, memory problems not related to HE. Psychology: Negative for anxiety, depression. FAMILY HISTORY:  The father  of MI. The mother  of DM, heart disease. There is no family history of liver disease. SOCIAL HISTORY:  The patient is . The patient has 3 children. The patient has never used tobacco products. The patient has never consumed significant amounts of alcohol. The patient currently works full time as a CNA. PHYSICAL EXAMINATION:  Visit Vitals  /82 (BP 1 Location: Right upper arm, BP Patient Position: Sitting, BP Cuff Size: Adult)   Pulse 88   Temp 96.9 °F (36.1 °C) (Temporal)   Resp 16   Ht 5' 1\" (1.549 m)   Wt 163 lb 6.4 oz (74.1 kg)   LMP 2014   SpO2 97%   BMI 30.87 kg/m²       General: No acute distress. Eyes: Sclera anicteric. ENT: No oral lesions. Thyroid normal.  Nodes: No adenopathy. Skin: No spider angiomata. No jaundice. No palmar erythema. Respiratory: Lungs clear to auscultation.    Cardiovascular: Regular heart rate. No murmurs. No JVD. Abdomen: Soft non-tender, liver size normal to percussion/palpation. Spleen not palpable. No obvious ascites. Extremities: No edema. No muscle wasting. No gross arthritic changes. Neurologic: Alert and oriented. Cranial nerves grossly intact. No asterixis. LABORATORY STUDIES:  From: 7/2021 ordered by PCP   AST/ALT/ALP/T Bili/ALB:26/29/81/0.4/4.6  WBC/HB/PLT:4.5/14.1/249  NA/BUN/CREAT:144/13/0.55    From 7/2019  AST/ALT/ALP/T Bili/ALB: 70/109/67/0.4/4.6  WBC/HB/PLT/INR:4.5/14.1/267  BUN/CREAT:11/0.62    [facility] [address]  [address] & Units 8/12/2019 2/22/2019   WBC 3.6 - 11.0 K/uL 4.4    ANC 1.8 - 8.0 K/UL 2.4    HGB 11.5 - 16.0 g/dL 14.6     - 400 K/uL 236    AST 0 - 40 IU/L  39   ALT 0 - 32 IU/L  57 (H)   Alk Phos 39 - 117 IU/L  77   Bili, Total 0.0 - 1.2 mg/dL  0.3   Bili, Direct 0.00 - 0.40 mg/dL  0.11   Albumin 3.5 - 5.5 g/dL  4.6   BUN 6 - 20 MG/DL 11    Creat 0.55 - 1.02 MG/DL 0.67    Na 136 - 145 mmol/L 139    K 3.5 - 5.1 mmol/L 4.1    Cl 97 - 108 mmol/L 105    CO2 21 - 32 mmol/L 26    Glucose 65 - 100 mg/dL 111 (H)      Liver [facility] [address] [place] [address] Ref Rng & Units 1/18/2019   WBC 3.6 - 11.0 K/uL    ANC 1.8 - 8.0 K/UL    HGB 11.5 - 16.0 g/dL     - 400 K/uL    AST 0 - 40 IU/L 32   ALT 0 - 32 IU/L 47 (H)   Alk Phos 39 - 117 IU/L 72   Bili, Total 0.0 - 1.2 mg/dL 0.4   Bili, Direct 0.00 - 0.40 mg/dL    Albumin 3.5 - 5.5 g/dL 4.8   BUN 6 - 20 MG/DL 14   Creat 0.55 - 1.02 MG/DL 0.60   Na 136 - 145 mmol/L 144   K 3.5 - 5.1 mmol/L 5.0   Cl 97 - 108 mmol/L 104   CO2 21 - 32 mmol/L 22   Glucose 65 - 100 mg/dL 109 (H)     Laboratory testing ordered by the PCP  7/2021 reviewed in detail. Additional testing included to evaluate progression or regression of disease.      SEROLOGIES:  Serologies Latest Ref Rng & Units 4/28/2017   Hep A Ab, Total Negative Positive (A)   Hep B Surface Ag Negative Negative   Hep B Core Ab, Total Negative Negative   Hep B Surface AB QL  Non Reactive   Hep C Ab 0.0 - 0.9 s/co ratio <0.1   Ferritin 15 - 150 ng/mL 192 (H)   Iron % Saturation 15 - 55 % 25   TAHIRA, IFA  Negative   ASMCA 0 - 19 Units 13   Ceruloplasmin 19.0 - 39.0 mg/dL 42.3 (H)   Alpha-1 antitrypsin level 90 - 200 mg/dL 182     LIVER HISTOLOGY:  1/2018. FibroScan performed at [facility]. EkPa was 3.7. Suggested fibrosis level is F0-1. CAP score of 280, this is consistent with steatosis. 9/2019. FibroScan performed at [facility]. EkPa was 5.3. Suggested fibrosis level is F0-1. CAP score of 306, this is consistent with steatosis. 9/2021. FibroScan performed at [facility]. EkPa was 4.1. IQR/med 7%. . The results suggested a fibrosis level of F0. The CAP score suggests there is hepatic steatosis. ENDOSCOPIC PROCEDURES:  Not available or performed    RADIOLOGY:  1/2017. Ultrasound of liver. Echogenic consistent with chronic liver disease. No liver mass lesions. No dilated bile ducts. No ascites. OTHER TESTING:  Not available or performed    FOLLOW-UP:  All of the issues listed above in the Assessment and Plan were discussed with the patient. All questions were answered. The patient expressed a clear understanding of the above. Follow-up [address] as needed only if the liver enzymes should elevated. See PCP in 6 months for ongoing testing. BRANDI Wallace-BC  [facility] of [address] [address] [place], [address], [address]  [address] [facility]

## 2021-09-13 NOTE — PROGRESS NOTES
Identified pt with two pt identifiers(name and ). Reviewed record in preparation for visit and have obtained necessary documentation. Chief Complaint   Patient presents with    Elevated Liver Enzymes     2 year Fibroscan f/u      Vitals:    21 1112   BP: 117/82   Pulse: 88   Resp: 16   Temp: 96.9 °F (36.1 °C)   TempSrc: Temporal   SpO2: 97%   Weight: 163 lb 6.4 oz (74.1 kg)   Height: 5' 1\" (1.549 m)   PainSc:   0 - No pain   LMP: 2014       Health Maintenance Review: Patient reminded of \"due or due soon\" health maintenance. I have asked the patient to contact his/her primary care provider (PCP) for follow-up on his/her health maintenance. Coordination of Care Questionnaire:  :   1) Have you been to an emergency room, urgent care, or hospitalized since your last visit? If yes, where when, and reason for visit? no       2. Have seen or consulted any other health care provider since your last visit? If yes, where when, and reason for visit? YES, regular f/u w/ PCP and Oncologist     Patient is accompanied by self I have received verbal consent from Mary Andrade to discuss any/all medical information while they are present in the room.

## 2021-10-15 ENCOUNTER — HOSPITAL ENCOUNTER (OUTPATIENT)
Dept: MAMMOGRAPHY | Age: 56
Discharge: HOME | End: 2021-10-15
Attending: INTERNAL MEDICINE
Payer: COMMERCIAL

## 2021-10-15 DIAGNOSIS — Z12.31 VISIT FOR SCREENING MAMMOGRAM: ICD-10-CM

## 2021-10-15 PROCEDURE — 77062 BREAST TOMOSYNTHESIS BI: CPT

## 2021-12-30 LAB — HBA1C MFR BLD HPLC: 6.3 %

## 2022-01-21 DIAGNOSIS — E11.8 CONTROLLED TYPE 2 DIABETES MELLITUS WITH COMPLICATION, WITHOUT LONG-TERM CURRENT USE OF INSULIN (HCC): ICD-10-CM

## 2022-01-25 RX ORDER — LANCETS 33 GAUGE
EACH MISCELLANEOUS
Qty: 100 EACH | Refills: 11 | Status: SHIPPED | OUTPATIENT
Start: 2022-01-25

## 2022-01-25 RX ORDER — BLOOD SUGAR DIAGNOSTIC
STRIP MISCELLANEOUS
Qty: 100 STRIP | Refills: 1 | Status: SHIPPED | OUTPATIENT
Start: 2022-01-25

## 2022-01-25 NOTE — TELEPHONE ENCOUNTER
Left message for patient to return call to office to schedule the next available appointment with Dr. Tracie Medina.

## 2022-01-26 NOTE — TELEPHONE ENCOUNTER
Left message for patient to return call to office to schedule the next available appointment with Dr. Liz Serna.

## 2022-01-27 ENCOUNTER — TELEPHONE (OUTPATIENT)
Dept: INTERNAL MEDICINE CLINIC | Age: 57
End: 2022-01-27

## 2022-01-27 NOTE — TELEPHONE ENCOUNTER
----- Message from [place] sent at 1/27/2022  3:40 PM EST -----  Subject: Message to Provider    QUESTIONS  Information for Provider? Pt called because she missed a call and was   wondering what it was for. She would like a detailed voicemail if she   doesn't answer. ---------------------------------------------------------------------------  --------------  Júnior LUCIA  What is the best way for the office to contact you? OK to leave message on   voicemail  Preferred Call Back Phone Number?  [phone]  ---------------------------------------------------------------------------  --------------  SCRIPT ANSWERS  undefined

## 2022-01-28 NOTE — TELEPHONE ENCOUNTER
Returned call to patient and left a detailed message for patient to return call to office to schedule the next available appointment with Dr. Reina Ingram. Originally left two detailed messages for patient on 1/25/22 and 1/26/22. Letter sent 1/27/22.

## 2022-03-19 PROBLEM — R74.8 ELEVATED LIVER ENZYMES: Status: ACTIVE | Noted: 2018-01-09

## 2022-03-19 PROBLEM — Z98.890 S/P BREAST RECONSTRUCTION: Status: ACTIVE | Noted: 2017-04-28

## 2022-03-19 PROBLEM — R73.03 PREDIABETES: Status: ACTIVE | Noted: 2018-01-09

## 2022-09-20 ENCOUNTER — TRANSCRIBE ORDER (OUTPATIENT)
Dept: SCHEDULING | Age: 57
End: 2022-09-20

## 2022-09-20 DIAGNOSIS — C50.511 MALIGNANT NEOPLASM OF LOWER-OUTER QUADRANT OF RIGHT FEMALE BREAST (HCC): Primary | ICD-10-CM

## 2022-09-20 DIAGNOSIS — Z13.820 ENCOUNTER FOR SCREENING FOR OSTEOPOROSIS: ICD-10-CM

## 2022-10-29 ENCOUNTER — HOSPITAL ENCOUNTER (OUTPATIENT)
Dept: MAMMOGRAPHY | Age: 57
Discharge: HOME | End: 2022-10-29
Attending: INTERNAL MEDICINE
Payer: COMMERCIAL

## 2022-10-29 DIAGNOSIS — C50.511 MALIGNANT NEOPLASM OF LOWER-OUTER QUADRANT OF RIGHT FEMALE BREAST (HCC): ICD-10-CM

## 2022-10-29 DIAGNOSIS — Z13.820 ENCOUNTER FOR SCREENING FOR OSTEOPOROSIS: ICD-10-CM

## 2022-10-29 PROCEDURE — 77063 BREAST TOMOSYNTHESIS BI: CPT

## 2023-03-04 DIAGNOSIS — E11.8 CONTROLLED TYPE 2 DIABETES MELLITUS WITH COMPLICATION, WITHOUT LONG-TERM CURRENT USE OF INSULIN (HCC): ICD-10-CM

## 2023-03-04 RX ORDER — LANCETS 33 GAUGE
EACH MISCELLANEOUS
Refills: 11 | OUTPATIENT
Start: 2023-03-04

## 2023-03-04 RX ORDER — BLOOD SUGAR DIAGNOSTIC
STRIP MISCELLANEOUS
Qty: 100 STRIP | Refills: 1 | OUTPATIENT
Start: 2023-03-04

## 2023-05-20 RX ORDER — FEXOFENADINE HCL 180 MG/1
180 TABLET ORAL DAILY PRN
COMMUNITY

## 2023-05-20 RX ORDER — LANCETS 30 GAUGE
EACH MISCELLANEOUS
COMMUNITY
Start: 2020-01-29

## 2023-05-20 RX ORDER — PANTOPRAZOLE SODIUM 40 MG/1
40 TABLET, DELAYED RELEASE ORAL DAILY
COMMUNITY
Start: 2021-08-04

## 2023-05-20 RX ORDER — FAMOTIDINE 20 MG/1
1 TABLET, FILM COATED ORAL 2 TIMES DAILY
COMMUNITY
Start: 2020-01-02

## 2023-09-19 ENCOUNTER — TRANSCRIBE ORDERS (OUTPATIENT)
Facility: HOSPITAL | Age: 58
End: 2023-09-19

## 2023-09-19 DIAGNOSIS — Z12.31 SCREENING MAMMOGRAM FOR HIGH-RISK PATIENT: Primary | ICD-10-CM

## 2023-10-04 ENCOUNTER — HOSPITAL ENCOUNTER (OUTPATIENT)
Facility: HOSPITAL | Age: 58
Discharge: HOME | End: 2023-10-07
Attending: INTERNAL MEDICINE
Payer: COMMERCIAL

## 2023-10-04 DIAGNOSIS — Z12.31 SCREENING MAMMOGRAM FOR HIGH-RISK PATIENT: ICD-10-CM

## 2023-10-04 PROCEDURE — 77067 SCR MAMMO BI INCL CAD: CPT

## 2024-10-08 ENCOUNTER — HOSPITAL ENCOUNTER (OUTPATIENT)
Facility: HOSPITAL | Age: 59
Discharge: HOME | End: 2024-10-11
Attending: INTERNAL MEDICINE
Payer: OTHER GOVERNMENT

## 2024-10-08 VITALS — BODY MASS INDEX: 30.84 KG/M2 | HEIGHT: 61 IN | WEIGHT: 163.36 LBS

## 2024-10-08 DIAGNOSIS — Z13.820 ENCOUNTER FOR SCREENING FOR OSTEOPOROSIS: ICD-10-CM

## 2024-10-08 DIAGNOSIS — C50.511 MALIGNANT NEOPLASM OF LOWER-OUTER QUADRANT OF RIGHT FEMALE BREAST, UNSPECIFIED ESTROGEN RECEPTOR STATUS (HCC): ICD-10-CM

## 2024-10-08 DIAGNOSIS — Z12.31 ENCOUNTER FOR SCREENING MAMMOGRAM FOR MALIGNANT NEOPLASM OF BREAST: ICD-10-CM

## 2024-10-08 PROCEDURE — 77063 BREAST TOMOSYNTHESIS BI: CPT

## 2025-01-14 ENCOUNTER — TRANSCRIBE ORDERS (OUTPATIENT)
Facility: HOSPITAL | Age: 60
End: 2025-01-14

## 2025-01-14 DIAGNOSIS — C50.511 MALIGNANT NEOPLASM OF LOWER-OUTER QUADRANT OF RIGHT FEMALE BREAST, UNSPECIFIED ESTROGEN RECEPTOR STATUS (HCC): Primary | ICD-10-CM

## (undated) DEVICE — KENDALL DL ECG CABLE AND LEAD WIRE SYSTEM, 3-LEAD, SINGLE PATIENT USE: Brand: KENDALL

## (undated) DEVICE — KERLIX BANDAGE ROLL: Brand: KERLIX

## (undated) DEVICE — Device

## (undated) DEVICE — ROCKER SWITCH PENCIL BLADE ELECTRODE, HOLSTER: Brand: EDGE

## (undated) DEVICE — STAPLER SKIN SQ 30 ABSRB STPL DISP INSORB

## (undated) DEVICE — HANDLE LT SNAP ON ULT DURABLE LENS FOR TRUMPF ALC DISPOSABLE

## (undated) DEVICE — CURITY NON-ADHERENT STRIPS: Brand: CURITY

## (undated) DEVICE — GAUZE SPONGES,12 PLY: Brand: CURITY

## (undated) DEVICE — TOWEL 4 PLY TISS 19X30 SUE WHT

## (undated) DEVICE — SUTURE MCRYL SZ 3-0 L27IN ABSRB UD L19MM PS-2 3/8 CIR PRIM Y427H

## (undated) DEVICE — SYSTEM IMPL DEL FOR BRST IMPL FUN (SEE COMMENT)

## (undated) DEVICE — TRAY PREP DRY W/ PREM GLV 2 APPL 6 SPNG 2 UNDPD 1 OVERWRAP

## (undated) DEVICE — KIT TISS EXP W/ 60ML SYR 122CM TRNSF SET PIERCING DEV L25CM

## (undated) DEVICE — SOLIDIFIER FLD 2OZ 1500CC N DISINF IN BTL DISP SAFESORB

## (undated) DEVICE — BANDAGE COMPR SELF ADH 5 YDX4 IN TAN STRL PREMIERPRO LF

## (undated) DEVICE — FORCEPS BX L160CM DIA8MM GRSP DISECT CUP TIP NONLOCKING ROT

## (undated) DEVICE — DRESSING PETRO GZ XRFRM CURAD ST OVERWRAP 5 X 9 IN

## (undated) DEVICE — REM POLYHESIVE ADULT PATIENT RETURN ELECTRODE: Brand: VALLEYLAB

## (undated) DEVICE — SYR 10ML LUER LOK 1/5ML GRAD --

## (undated) DEVICE — ABDOMINAL PAD: Brand: DERMACEA

## (undated) DEVICE — INFECTION CONTROL KIT SYS

## (undated) DEVICE — SOLUTION IV 1000ML 0.9% SOD CHL

## (undated) DEVICE — STERILE POLYISOPRENE POWDER-FREE SURGICAL GLOVES: Brand: PROTEXIS

## (undated) DEVICE — TUBING SUCT 10FR MAL ALUM SHFT FN CAP VENT UNIV CONN W/ OBT

## (undated) DEVICE — DERMABOND SKIN ADH 0.7ML -- DERMABOND ADVANCED 12/BX

## (undated) DEVICE — SUTURE VCRL SZ 0 L27IN ABSRB UD L36MM CT-1 1/2 CIR J260H

## (undated) DEVICE — GOWN,SIRUS,NONRNF,SETINSLV,2XL,18/CS: Brand: MEDLINE

## (undated) DEVICE — BASIN EMSIS 16OZ GRAPHITE PLAS KID SHP MOLD GRAD FOR ORAL

## (undated) DEVICE — 3M(TM) MEDIPORE(TM) H SOFT CLOTH TAPE 2866: Brand: 3M™ MEDIPORE™

## (undated) DEVICE — STERILE POLYISOPRENE POWDER-FREE SURGICAL GLOVES WITH EMOLLIENT COATING: Brand: PROTEXIS

## (undated) DEVICE — ELECTRODE,RADIOTRANSLUCENT,FOAM,5PK: Brand: MEDLINE

## (undated) DEVICE — SET ADMIN 16ML TBNG L100IN 2 Y INJ SITE IV PIGGY BK DISP

## (undated) DEVICE — CONTAINER SPEC 20 ML LID NEUT BUFF FORMALIN 10 % POLYPR STS

## (undated) DEVICE — CONTINU-FLO SOLUTION SET, 2 INJECTION SITES, MALE LUER LOCK ADAPTER WITH RETRACTABLE COLLAR, LARGE BORE STOPCOCK WITH ROTATING MALE LUER LOCK EXTENSION SET, 2 INJECTION SITES, MALE LUER LOCK ADAPTER WITH RETRACTABLE COLLAR: Brand: INTERLINK/CONTINU-FLO

## (undated) DEVICE — 1200 GUARD II KIT W/5MM TUBE W/O VAC TUBE: Brand: GUARDIAN

## (undated) DEVICE — STAPLER SKIN 35CT WD STRL DISP -- MULTIFIRE PREMIUM

## (undated) DEVICE — DRAPE,CHEST,FENES,15X10,STERIL: Brand: MEDLINE

## (undated) DEVICE — SPONGE GZ W4XL4IN COT 12 PLY TYP VII WVN C FLD DSGN

## (undated) DEVICE — BAG SPEC BIOHZRD 10 X 10 IN --

## (undated) DEVICE — TOWEL SURG W17XL27IN STD BLU COT NONFENESTRATED PREWASHED

## (undated) DEVICE — SUTURE VCRL SZ 3-0 L27IN ABSRB UD L19MM PS-2 3/8 CIR PRIM J427H

## (undated) DEVICE — NEEDLE HYPO 18GA L1.5IN PNK S STL HUB POLYPR SHLD REG BVL

## (undated) DEVICE — INTENDED FOR TISSUE SEPARATION, AND OTHER PROCEDURES THAT REQUIRE A SHARP SURGICAL BLADE TO PUNCTURE OR CUT.: Brand: BARD-PARKER ® CARBON RIB-BACK BLADES

## (undated) DEVICE — (D)PREP SKN CHLRAPRP APPL 26ML -- CONVERT TO ITEM 371833

## (undated) DEVICE — KIT INFECTION CTRL ST FRAN --

## (undated) DEVICE — BANDAGE COMPR M W6INXL10YD WHT BGE VELC E MTRX HK AND LOOP

## (undated) DEVICE — 3M™ MICROFOAM™ SURGICAL TAPE 4 ROLLS/CARTON 6 CARTONS/CASE 1528-3: Brand: 3M™ MICROFOAM™

## (undated) DEVICE — YANKAUER,TAPERED BULBOUS TIP,W/O VENT: Brand: MEDLINE

## (undated) DEVICE — EXTREMITY III-LF: Brand: MEDLINE INDUSTRIES, INC.

## (undated) DEVICE — SLIM BODY SKIN STAPLER: Brand: APPOSE ULC

## (undated) DEVICE — SURGICAL PROCEDURE PACK BASIN MAJ SET CUST NO CAUT

## (undated) DEVICE — NEONATAL-ADULT SPO2 SENSOR: Brand: NELLCOR

## (undated) DEVICE — ELECTRODE NDL TOT L2.13IN EXPOSED L3CM ACT L3MM TIP

## (undated) DEVICE — SOLUTION LACTATED RINGERS INJECTION USP

## (undated) DEVICE — SUTURE MCRYL SZ 4-0 L27IN ABSRB UD L19MM PS-2 1/2 CIR PRIM Y426H

## (undated) DEVICE — DRAPE,REIN 53X77,STERILE: Brand: MEDLINE

## (undated) DEVICE — DEVON™ KNEE AND BODY STRAP 60" X 3" (1.5 M X 7.6 CM): Brand: DEVON

## (undated) DEVICE — 3M™ TEGADERM™ TRANSPARENT FILM DRESSING FRAME STYLE, 1624W, 2-3/8 IN X 2-3/4 IN (6 CM X 7 CM), 100/CT 4CT/CASE: Brand: 3M™ TEGADERM™

## (undated) DEVICE — DBD-PACK,LAPAROTOMY,2 REINFORCED GOWNS: Brand: MEDLINE

## (undated) DEVICE — SOLUTION IRRIG 1000ML H2O STRL BLT

## (undated) DEVICE — COVER,TABLE,60X90,STERILE: Brand: MEDLINE

## (undated) DEVICE — KENDALL SCD EXPRESS SLEEVES, KNEE LENGTH, MEDIUM: Brand: KENDALL SCD

## (undated) DEVICE — SYR 3ML LL TIP 1/10ML GRAD --

## (undated) DEVICE — BLADE ELECTRODE: Brand: EDGE

## (undated) DEVICE — TELFA ADHESIVE ISLAND DRESSING: Brand: TELFA

## (undated) DEVICE — PAD,ABDOMINAL,5"X9",ST,LF,25/BX: Brand: MEDLINE INDUSTRIES, INC.

## (undated) DEVICE — CATH IV AUTOGRD BC PNK 20GA 25 -- INSYTE

## (undated) DEVICE — BLOCK BITE ENDOSCP AD 21 MM W/ DIL BLU LF DISP

## (undated) DEVICE — Z DISCONTINUED PER MEDLINE LINE GAS SAMPLING O2/CO2 LNG AD 13 FT NSL W/ TBNG FILTERLINE

## (undated) DEVICE — PADDING CST 6IN STERILE --

## (undated) DEVICE — SUTURE VCRL SZ 3-0 L18IN ABSRB UD PS-2 L19MM 3/8 CRV PRIM J497H